# Patient Record
Sex: FEMALE | Race: WHITE | Employment: OTHER | URBAN - METROPOLITAN AREA
[De-identification: names, ages, dates, MRNs, and addresses within clinical notes are randomized per-mention and may not be internally consistent; named-entity substitution may affect disease eponyms.]

---

## 2021-12-15 ENCOUNTER — HOSPITAL ENCOUNTER (OUTPATIENT)
Dept: GENERAL RADIOLOGY | Age: 61
Discharge: HOME | End: 2021-12-15
Payer: COMMERCIAL

## 2021-12-15 ENCOUNTER — TRANSCRIBE ORDER (OUTPATIENT)
Dept: GENERAL RADIOLOGY | Age: 61
End: 2021-12-15

## 2021-12-15 DIAGNOSIS — M79.644 PAIN OF RIGHT THUMB: ICD-10-CM

## 2021-12-15 DIAGNOSIS — M79.645 PAIN OF LEFT THUMB: ICD-10-CM

## 2021-12-15 DIAGNOSIS — M79.645 PAIN OF LEFT THUMB: Primary | ICD-10-CM

## 2021-12-15 PROCEDURE — 73140 X-RAY EXAM OF FINGER(S): CPT

## 2021-12-26 ENCOUNTER — APPOINTMENT (OUTPATIENT)
Dept: GENERAL RADIOLOGY | Age: 61
DRG: 177 | End: 2021-12-26
Attending: EMERGENCY MEDICINE
Payer: COMMERCIAL

## 2021-12-26 ENCOUNTER — HOSPITAL ENCOUNTER (INPATIENT)
Age: 61
LOS: 5 days | Discharge: HOME | DRG: 177 | End: 2021-12-31
Attending: EMERGENCY MEDICINE | Admitting: INTERNAL MEDICINE
Payer: COMMERCIAL

## 2021-12-26 DIAGNOSIS — U07.1 COVID: ICD-10-CM

## 2021-12-26 DIAGNOSIS — J96.01 ACUTE RESPIRATORY FAILURE WITH HYPOXIA (HCC): Primary | ICD-10-CM

## 2021-12-26 LAB
ALBUMIN SERPL-MCNC: 2.8 G/DL (ref 3.5–5)
ALBUMIN/GLOB SERPL: 0.6 {RATIO} (ref 1.1–2.2)
ALP SERPL-CCNC: 58 U/L (ref 45–117)
ALT SERPL-CCNC: 34 U/L (ref 12–78)
ANION GAP SERPL CALC-SCNC: 11 MMOL/L (ref 5–15)
AST SERPL-CCNC: 48 U/L (ref 15–37)
BASOPHILS # BLD: 0 K/UL (ref 0–0.1)
BASOPHILS NFR BLD: 0 % (ref 0–1)
BILIRUB SERPL-MCNC: 0.6 MG/DL (ref 0.2–1)
BUN SERPL-MCNC: 20 MG/DL (ref 6–20)
BUN/CREAT SERPL: 20 (ref 12–20)
CALCIUM SERPL-MCNC: 8.6 MG/DL (ref 8.5–10.1)
CHLORIDE SERPL-SCNC: 101 MMOL/L (ref 97–108)
CO2 SERPL-SCNC: 25 MMOL/L (ref 21–32)
COVID-19 RAPID TEST, COVR: DETECTED
CREAT SERPL-MCNC: 0.98 MG/DL (ref 0.55–1.02)
CRP SERPL-MCNC: 7.14 MG/DL (ref 0–0.6)
D DIMER PPP FEU-MCNC: 0.61 MG/L FEU (ref 0–0.65)
DIFFERENTIAL METHOD BLD: ABNORMAL
EOSINOPHIL # BLD: 0 K/UL (ref 0–0.4)
EOSINOPHIL NFR BLD: 0 % (ref 0–7)
ERYTHROCYTE [DISTWIDTH] IN BLOOD BY AUTOMATED COUNT: 12.3 % (ref 11.5–14.5)
FERRITIN SERPL-MCNC: 788 NG/ML (ref 8–252)
FIBRINOGEN PPP-MCNC: 743 MG/DL (ref 200–475)
GLOBULIN SER CALC-MCNC: 4.4 G/DL (ref 2–4)
GLUCOSE SERPL-MCNC: 132 MG/DL (ref 65–100)
HCT VFR BLD AUTO: 46.1 % (ref 35–47)
HGB BLD-MCNC: 15.5 G/DL (ref 11.5–16)
IMM GRANULOCYTES # BLD AUTO: 0 K/UL
IMM GRANULOCYTES NFR BLD AUTO: 0 %
INR PPP: 1 (ref 0.9–1.1)
LACTATE BLD-SCNC: 1.57 MMOL/L (ref 0.4–2)
LDH SERPL L TO P-CCNC: 514 U/L (ref 81–246)
LYMPHOCYTES # BLD: 0.3 K/UL (ref 0.8–3.5)
LYMPHOCYTES NFR BLD: 4 % (ref 12–49)
MAGNESIUM SERPL-MCNC: 2.2 MG/DL (ref 1.6–2.4)
MCH RBC QN AUTO: 30.3 PG (ref 26–34)
MCHC RBC AUTO-ENTMCNC: 33.6 G/DL (ref 30–36.5)
MCV RBC AUTO: 90.2 FL (ref 80–99)
MONOCYTES # BLD: 0.1 K/UL (ref 0–1)
MONOCYTES NFR BLD: 1 % (ref 5–13)
NEUTS BAND NFR BLD MANUAL: 6 % (ref 0–6)
NEUTS SEG # BLD: 8 K/UL (ref 1.8–8)
NEUTS SEG NFR BLD: 86 % (ref 32–75)
NRBC # BLD: 0 K/UL (ref 0–0.01)
NRBC BLD-RTO: 0 PER 100 WBC
OTHER CELLS NFR BLD MANUAL: 3 %
PHOSPHATE SERPL-MCNC: 3.2 MG/DL (ref 2.6–4.7)
PLATELET # BLD AUTO: 288 K/UL (ref 150–400)
PMV BLD AUTO: 9.1 FL (ref 8.9–12.9)
POTASSIUM SERPL-SCNC: 3.7 MMOL/L (ref 3.5–5.1)
PROCALCITONIN SERPL-MCNC: 0.25 NG/ML
PROT SERPL-MCNC: 7.2 G/DL (ref 6.4–8.2)
PROTHROMBIN TIME: 9.9 SEC (ref 9–11.1)
RBC # BLD AUTO: 5.11 M/UL (ref 3.8–5.2)
RBC MORPH BLD: ABNORMAL
SODIUM SERPL-SCNC: 137 MMOL/L (ref 136–145)
SOURCE, COVRS: ABNORMAL
TROPONIN-HIGH SENSITIVITY: 8 NG/L (ref 0–51)
WBC # BLD AUTO: 8.7 K/UL (ref 3.6–11)

## 2021-12-26 PROCEDURE — 94762 N-INVAS EAR/PLS OXIMTRY CONT: CPT

## 2021-12-26 PROCEDURE — 96375 TX/PRO/DX INJ NEW DRUG ADDON: CPT

## 2021-12-26 PROCEDURE — 85384 FIBRINOGEN ACTIVITY: CPT

## 2021-12-26 PROCEDURE — 71045 X-RAY EXAM CHEST 1 VIEW: CPT

## 2021-12-26 PROCEDURE — 85610 PROTHROMBIN TIME: CPT

## 2021-12-26 PROCEDURE — 87077 CULTURE AEROBIC IDENTIFY: CPT

## 2021-12-26 PROCEDURE — 93005 ELECTROCARDIOGRAM TRACING: CPT

## 2021-12-26 PROCEDURE — 36415 COLL VENOUS BLD VENIPUNCTURE: CPT

## 2021-12-26 PROCEDURE — XW0DXM6 INTRODUCTION OF BARICITINIB INTO MOUTH AND PHARYNX, EXTERNAL APPROACH, NEW TECHNOLOGY GROUP 6: ICD-10-PCS | Performed by: INTERNAL MEDICINE

## 2021-12-26 PROCEDURE — 80053 COMPREHEN METABOLIC PANEL: CPT

## 2021-12-26 PROCEDURE — 74011250636 HC RX REV CODE- 250/636: Performed by: INTERNAL MEDICINE

## 2021-12-26 PROCEDURE — 87635 SARS-COV-2 COVID-19 AMP PRB: CPT

## 2021-12-26 PROCEDURE — 83735 ASSAY OF MAGNESIUM: CPT

## 2021-12-26 PROCEDURE — 86140 C-REACTIVE PROTEIN: CPT

## 2021-12-26 PROCEDURE — 84100 ASSAY OF PHOSPHORUS: CPT

## 2021-12-26 PROCEDURE — 74011250637 HC RX REV CODE- 250/637: Performed by: INTERNAL MEDICINE

## 2021-12-26 PROCEDURE — 84145 PROCALCITONIN (PCT): CPT

## 2021-12-26 PROCEDURE — 84484 ASSAY OF TROPONIN QUANT: CPT

## 2021-12-26 PROCEDURE — 99285 EMERGENCY DEPT VISIT HI MDM: CPT

## 2021-12-26 PROCEDURE — 85379 FIBRIN DEGRADATION QUANT: CPT

## 2021-12-26 PROCEDURE — 83615 LACTATE (LD) (LDH) ENZYME: CPT

## 2021-12-26 PROCEDURE — 82728 ASSAY OF FERRITIN: CPT

## 2021-12-26 PROCEDURE — 83605 ASSAY OF LACTIC ACID: CPT

## 2021-12-26 PROCEDURE — 77030038269 HC DRN EXT URIN PURWCK BARD -A

## 2021-12-26 PROCEDURE — 85025 COMPLETE CBC W/AUTO DIFF WBC: CPT

## 2021-12-26 PROCEDURE — 87040 BLOOD CULTURE FOR BACTERIA: CPT

## 2021-12-26 PROCEDURE — 65270000029 HC RM PRIVATE

## 2021-12-26 PROCEDURE — 96374 THER/PROPH/DIAG INJ IV PUSH: CPT

## 2021-12-26 PROCEDURE — 74011250636 HC RX REV CODE- 250/636: Performed by: EMERGENCY MEDICINE

## 2021-12-26 RX ORDER — ZONISAMIDE 100 MG/1
300 CAPSULE ORAL DAILY
COMMUNITY

## 2021-12-26 RX ORDER — ONDANSETRON 2 MG/ML
4 INJECTION INTRAMUSCULAR; INTRAVENOUS
Status: DISCONTINUED | OUTPATIENT
Start: 2021-12-26 | End: 2021-12-31 | Stop reason: HOSPADM

## 2021-12-26 RX ORDER — ACETAMINOPHEN 325 MG/1
650 TABLET ORAL
Status: DISCONTINUED | OUTPATIENT
Start: 2021-12-26 | End: 2021-12-31 | Stop reason: HOSPADM

## 2021-12-26 RX ORDER — SODIUM CHLORIDE 0.9 % (FLUSH) 0.9 %
5-40 SYRINGE (ML) INJECTION EVERY 8 HOURS
Status: DISCONTINUED | OUTPATIENT
Start: 2021-12-26 | End: 2021-12-31 | Stop reason: HOSPADM

## 2021-12-26 RX ORDER — VALACYCLOVIR HYDROCHLORIDE 500 MG/1
500 TABLET, FILM COATED ORAL DAILY
COMMUNITY

## 2021-12-26 RX ORDER — BENZONATATE 100 MG/1
100 CAPSULE ORAL
Status: DISCONTINUED | OUTPATIENT
Start: 2021-12-26 | End: 2021-12-31 | Stop reason: HOSPADM

## 2021-12-26 RX ORDER — AMITRIPTYLINE HYDROCHLORIDE 10 MG/1
30 TABLET, FILM COATED ORAL
Status: DISCONTINUED | OUTPATIENT
Start: 2021-12-26 | End: 2021-12-31 | Stop reason: HOSPADM

## 2021-12-26 RX ORDER — ZINC SULFATE 50(220)MG
1 CAPSULE ORAL DAILY
Status: DISCONTINUED | OUTPATIENT
Start: 2021-12-27 | End: 2021-12-31 | Stop reason: HOSPADM

## 2021-12-26 RX ORDER — PROMETHAZINE HYDROCHLORIDE 25 MG/1
12.5 TABLET ORAL
Status: DISCONTINUED | OUTPATIENT
Start: 2021-12-26 | End: 2021-12-31 | Stop reason: HOSPADM

## 2021-12-26 RX ORDER — DEXAMETHASONE SODIUM PHOSPHATE 4 MG/ML
10 INJECTION, SOLUTION INTRA-ARTICULAR; INTRALESIONAL; INTRAMUSCULAR; INTRAVENOUS; SOFT TISSUE ONCE
Status: COMPLETED | OUTPATIENT
Start: 2021-12-26 | End: 2021-12-26

## 2021-12-26 RX ORDER — AMITRIPTYLINE HYDROCHLORIDE 10 MG/1
30 TABLET, FILM COATED ORAL
COMMUNITY

## 2021-12-26 RX ORDER — KETOROLAC TROMETHAMINE 30 MG/ML
30 INJECTION, SOLUTION INTRAMUSCULAR; INTRAVENOUS
Status: COMPLETED | OUTPATIENT
Start: 2021-12-26 | End: 2021-12-26

## 2021-12-26 RX ORDER — LEVOTHYROXINE SODIUM 112 UG/1
112 TABLET ORAL
COMMUNITY

## 2021-12-26 RX ORDER — ASCORBIC ACID 500 MG
500 TABLET ORAL 2 TIMES DAILY
Status: DISCONTINUED | OUTPATIENT
Start: 2021-12-26 | End: 2021-12-31 | Stop reason: HOSPADM

## 2021-12-26 RX ORDER — TOLTERODINE TARTRATE 2 MG/1
2 TABLET, EXTENDED RELEASE ORAL 2 TIMES DAILY
COMMUNITY

## 2021-12-26 RX ORDER — DEXAMETHASONE SODIUM PHOSPHATE 4 MG/ML
6 INJECTION, SOLUTION INTRA-ARTICULAR; INTRALESIONAL; INTRAMUSCULAR; INTRAVENOUS; SOFT TISSUE EVERY 12 HOURS
Status: DISCONTINUED | OUTPATIENT
Start: 2021-12-26 | End: 2021-12-30

## 2021-12-26 RX ORDER — DEXAMETHASONE SODIUM PHOSPHATE 100 MG/10ML
20 INJECTION INTRAMUSCULAR; INTRAVENOUS EVERY 24 HOURS
Status: DISCONTINUED | OUTPATIENT
Start: 2021-12-26 | End: 2021-12-26

## 2021-12-26 RX ORDER — ACETAMINOPHEN 650 MG/1
650 SUPPOSITORY RECTAL
Status: DISCONTINUED | OUTPATIENT
Start: 2021-12-26 | End: 2021-12-31 | Stop reason: HOSPADM

## 2021-12-26 RX ORDER — ZONISAMIDE 100 MG/1
300 CAPSULE ORAL DAILY
Status: DISCONTINUED | OUTPATIENT
Start: 2021-12-26 | End: 2021-12-31 | Stop reason: HOSPADM

## 2021-12-26 RX ORDER — SODIUM CHLORIDE 0.9 % (FLUSH) 0.9 %
5-40 SYRINGE (ML) INJECTION AS NEEDED
Status: DISCONTINUED | OUTPATIENT
Start: 2021-12-26 | End: 2021-12-31 | Stop reason: HOSPADM

## 2021-12-26 RX ORDER — ENOXAPARIN SODIUM 100 MG/ML
30 INJECTION SUBCUTANEOUS EVERY 12 HOURS
Status: DISCONTINUED | OUTPATIENT
Start: 2021-12-26 | End: 2021-12-31 | Stop reason: HOSPADM

## 2021-12-26 RX ORDER — SODIUM CHLORIDE 9 MG/ML
50 INJECTION, SOLUTION INTRAVENOUS CONTINUOUS
Status: DISCONTINUED | OUTPATIENT
Start: 2021-12-26 | End: 2021-12-27

## 2021-12-26 RX ORDER — VALACYCLOVIR HYDROCHLORIDE 500 MG/1
500 TABLET, FILM COATED ORAL DAILY
Status: DISCONTINUED | OUTPATIENT
Start: 2021-12-27 | End: 2021-12-30

## 2021-12-26 RX ORDER — SODIUM CHLORIDE 0.9 % (FLUSH) 0.9 %
5-10 SYRINGE (ML) INJECTION AS NEEDED
Status: DISCONTINUED | OUTPATIENT
Start: 2021-12-26 | End: 2021-12-31 | Stop reason: HOSPADM

## 2021-12-26 RX ORDER — GUAIFENESIN/DEXTROMETHORPHAN 100-10MG/5
5 SYRUP ORAL
Status: DISCONTINUED | OUTPATIENT
Start: 2021-12-26 | End: 2021-12-31 | Stop reason: HOSPADM

## 2021-12-26 RX ORDER — LEVOTHYROXINE SODIUM 112 UG/1
112 TABLET ORAL
Status: DISCONTINUED | OUTPATIENT
Start: 2021-12-26 | End: 2021-12-31 | Stop reason: HOSPADM

## 2021-12-26 RX ORDER — POLYETHYLENE GLYCOL 3350 17 G/17G
17 POWDER, FOR SOLUTION ORAL DAILY PRN
Status: DISCONTINUED | OUTPATIENT
Start: 2021-12-26 | End: 2021-12-31 | Stop reason: HOSPADM

## 2021-12-26 RX ADMIN — AMITRIPTYLINE HYDROCHLORIDE 30 MG: 10 TABLET, FILM COATED ORAL at 21:38

## 2021-12-26 RX ADMIN — AZITHROMYCIN MONOHYDRATE 500 MG: 500 INJECTION, POWDER, LYOPHILIZED, FOR SOLUTION INTRAVENOUS at 16:25

## 2021-12-26 RX ADMIN — SODIUM CHLORIDE 500 ML: 9 INJECTION, SOLUTION INTRAVENOUS at 19:40

## 2021-12-26 RX ADMIN — Medication 10 ML: at 21:40

## 2021-12-26 RX ADMIN — Medication 10 ML: at 17:40

## 2021-12-26 RX ADMIN — ENOXAPARIN SODIUM 30 MG: 100 INJECTION SUBCUTANEOUS at 21:41

## 2021-12-26 RX ADMIN — SODIUM CHLORIDE 100 ML/HR: 9 INJECTION, SOLUTION INTRAVENOUS at 21:36

## 2021-12-26 RX ADMIN — DEXAMETHASONE SODIUM PHOSPHATE 6 MG: 4 INJECTION, SOLUTION INTRA-ARTICULAR; INTRALESIONAL; INTRAMUSCULAR; INTRAVENOUS; SOFT TISSUE at 21:40

## 2021-12-26 RX ADMIN — DEXAMETHASONE SODIUM PHOSPHATE 6 MG: 4 INJECTION, SOLUTION INTRA-ARTICULAR; INTRALESIONAL; INTRAMUSCULAR; INTRAVENOUS; SOFT TISSUE at 16:26

## 2021-12-26 RX ADMIN — OXYCODONE HYDROCHLORIDE AND ACETAMINOPHEN 500 MG: 500 TABLET ORAL at 17:33

## 2021-12-26 RX ADMIN — Medication 10 ML: at 09:07

## 2021-12-26 RX ADMIN — OXYCODONE HYDROCHLORIDE AND ACETAMINOPHEN 500 MG: 500 TABLET ORAL at 21:37

## 2021-12-26 RX ADMIN — KETOROLAC TROMETHAMINE 30 MG: 30 INJECTION, SOLUTION INTRAMUSCULAR; INTRAVENOUS at 08:56

## 2021-12-26 RX ADMIN — ZONISAMIDE 300 MG: 100 CAPSULE ORAL at 17:38

## 2021-12-26 RX ADMIN — ONDANSETRON 4 MG: 2 INJECTION INTRAMUSCULAR; INTRAVENOUS at 16:27

## 2021-12-26 RX ADMIN — DEXAMETHASONE SODIUM PHOSPHATE 10 MG: 4 INJECTION, SOLUTION INTRAMUSCULAR; INTRAVENOUS at 08:57

## 2021-12-26 RX ADMIN — BARICITINIB 4 MG: 2 TABLET, FILM COATED ORAL at 17:33

## 2021-12-26 RX ADMIN — LEVOTHYROXINE SODIUM 112 MCG: 0.11 TABLET ORAL at 17:39

## 2021-12-26 RX ADMIN — Medication 10 ML: at 08:57

## 2021-12-26 RX ADMIN — ENOXAPARIN SODIUM 30 MG: 100 INJECTION SUBCUTANEOUS at 10:34

## 2021-12-26 NOTE — ED NOTES
Timeout:EMTALA completed. Report to SOLE Fischer of [facility]. Discharge or Transfer Assessment: Patient A&O x4 and in no distress. Physical re-examination reveals some improvement in pts condition with reassessment of vital signs completed at the time of admission transfer and/or discharge.

## 2021-12-26 NOTE — H&P
[address]  [phone]    Admission History and Physical      NAME:  Jennifer Sutton   :   1960   MRN:  [id number]     PCP:  Marian Mckeon MD     Date/Time:  2021         Subjective:     CHIEF COMPLAINT: \"I feel bad\"     HISTORY OF PRESENT ILLNESS:     Ms. Gena Miles is a 62 y.o.  female with PMH of seizure d/o, hypothyroidism admitted for acute respiratory failure / COVID. Per pt, first started having symptoms on 12/15. Was started on vitamins and IVERMECTIN by her PCP. Pt is NOT vaccinated. Pt progressively worsened over the course of her illness to the point she presented to [facility] ED. Pt currently on 5L O2     Past Medical History:   Diagnosis Date    Autoimmune disease ([address]     hypothroidism    Neurological disorder     seizures        Past Surgical History:   Procedure Laterality Date    HX ORTHOPAEDIC      ruptureed disc       Social History     Tobacco Use    Smoking status: Former Smoker    Smokeless tobacco: Current User   Substance Use Topics    Alcohol use: Yes     Comment: rarely      PMH   HTN     No Known Allergies     Prior to Admission medications    Medication Sig Start Date End Date Taking? Authorizing Provider   valACYclovir (Valtrex) 500 mg tablet Take 500 mg by mouth daily. Yes Provider, Historical   tolterodine (DETROL) 2 mg tablet Take 2 mg by mouth two (2) times a day. Yes Provider, Historical   amitriptyline (ELAVIL) 10 mg tablet Take 30 mg by mouth nightly. Yes Provider, Historical   levothyroxine (LevoxyL) 112 mcg tablet Take 112 mcg by mouth Daily (before breakfast). Yes Provider, Historical   zonisamide (ZONEGRAN) 100 mg capsule Take 300 mg by mouth daily.    Yes Provider, Historical         Review of Systems:  (bold if positive, if negative)    Gen:  Eyes:  ENT:  CVS:  Pulm:  GI:    :    MS:  Skin:  Psych:  Endo:    Hem:  Renal:    Neuro:     Poor PO intake, dyspnea, cough, malaise, weakness       Objective:      VITALS:    Vital signs reviewed; most recent are:    Visit Vitals  /70   Pulse 79   Temp 100.1 °F (37.8 °C)   Resp 26   Ht 5' 9\" (1.753 m)   Wt 83.9 kg (185 lb)   SpO2 92%   BMI 27.32 kg/m²     SpO2 Readings from Last 6 Encounters:   12/26/21 92%   05/19/14 98%   04/24/13 98%   12/19/12 98%        No intake or output data in the 24 hours ending 12/26/21 0498         Exam:     Physical Exam:    Gen: Ill appearing. Increased WOB. Tachypneic  HEENT:  Pink conjunctivae, PERRL, hearing intact to voice, moist mucous membranes  Neck:  Supple, without masses, thyroid non-tender  Resp: Diffuse crackles   Card:  No murmurs, normal S1, S2 without thrills, bruits or peripheral edema  Abd:  Soft, non-tender, non-distended, normoactive bowel sounds are present, no palpable organomegaly  Lymph:  No cervical adenopathy  Musc:  No cyanosis or clubbing  Skin:  No rashes or ulcers, skin turgor is good  Neuro:  Cranial nerves 3-12 are grossly intact,  strength is 5/5 bilaterally, dorsi / plantarflexion strength is 5/5 bilaterally, follows commands appropriately  Psych:  Alert with good insight. Oriented to person, place, and time       Labs:    Recent Labs     12/26/21  0839   WBC 8.7   HGB 15.5   HCT 46.1        Recent Labs     12/26/21  0839      K 3.7      CO2 25   *   BUN 20   CREA 0.98   CA 8.6   MG 2.2   PHOS 3.2   ALB 2.8*   ALT 34     No components found for: GLPOC  No results for input(s): PH, PCO2, PO2, HCO3, FIO2 in the last 72 hours. Recent Labs     12/26/21  0839   INR 1.0     CXR => diffuse bilateral interstitial infiltrates        Assessment/Plan:     Acute respiratory failure with hypoxia - unvaccinated. High risk for decompensation because of age, lack of vaccination status, CXR findings/degree of inflammatory marker elevation.  PCP started her on ivermectin which has NOT been clinically proven to provide any benefit in covid and it is unclear that it may not even lead to harm as not routinely used in humans. STOP Ivermectin   -admit   -start IV decadron   -start barcitinib   -vitamin C/zinc   -procalcitonin low; likely will stop azithro if remains low   -d-dimer WNL;  Lovenox for DVT prophylaxis   -incentive spirometry, OOB, prone; pt voice understanding   -trend inflammatory markers   -wean O2 as able       Seizure disorder ([facility] [address] (4/18/2013)  -continue outpt Zonegran       Hypothyroidism (4/18/2013)  -on levothyroxine    Surrogate decision maker:      Total time spent with patient: [address] discussed with: Patient and Nursing Staff    Discussed:  Care Plan    Prophylaxis:  Lovenox    Probable Disposition:  Home w/Family           ___________________________________________________    Attending Physician: Tevin Oropeza MD

## 2021-12-26 NOTE — ED NOTES
TRANSFER - OUT REPORT:    Verbal report given to [facility], [facility] RN(name) on Geneva Saucedo  being transferred to [facility] ED Hold(unit) for routine progression of care       Report consisted of patients Situation, Background, Assessment and   Recommendations(SBAR). Information from the following report(s) ED Summary, MAR and Recent Results was reviewed with the receiving nurse. Lines:   Peripheral IV 12/26/21 Left Antecubital (Active)   Site Assessment Clean, dry, & intact 12/26/21 0842   Phlebitis Assessment 0 12/26/21 0842   Infiltration Assessment 0 12/26/21 0842   Dressing Status Clean, dry, & intact 12/26/21 0842   Dressing Type Tape;Transparent 12/26/21 0842   Hub Color/Line Status Pink;Flushed;Patent 12/26/21 0842   Action Taken Blood drawn 12/26/21 9013        Opportunity for questions and clarification was provided.       Patient transported with:   [email or web address]

## 2021-12-26 NOTE — PROGRESS NOTES
[facility]: MED RECONCILIATION      Information obtained from: Patient in covid isolation. Called room and personal phone. Not answered. Talked to Patient's  who read me her medication bottles. Also confirmed this with Consuelo Pharmacist.      Allergies: Patient has no known allergies. Comment:  Zonisamide 300 mg daily: Based on last fill at [facility], patient is about 50 days overdue on the refill. I was not able to assess compliance with the patient herself. Prior to Admission Medications:     Medication Documentation Review Audit       Reviewed by YADIEL ReynosoD (Pharmacist) on 12/26/21 at [phone]      Medication Sig Documenting Provider Last Dose Status Taking?   amitriptyline (ELAVIL) 10 mg tablet Take 30 mg by mouth nightly. Provider, Historical  Active Yes   levothyroxine (LevoxyL) 112 mcg tablet Take 112 mcg by mouth Daily (before breakfast). Provider, Historical  Active Yes   tolterodine (DETROL) 2 mg tablet Take 2 mg by mouth two (2) times a day. Provider, Historical  Active Yes   valACYclovir (Valtrex) 500 mg tablet Take 500 mg by mouth daily. Provider, Historical  Active Yes   zonisamide (ZONEGRAN) 100 mg capsule Take 300 mg by mouth daily.  Provider, Historical  Active Yes                      [address] [facility]   Contact: [phone]

## 2021-12-26 NOTE — ED PROVIDER NOTES
62-year-old female presents with a chief complaint of shortness of breath. Patient was diagnosed with Covid on Monday. She was prescribed ivermectin by Dr. Slime Cervantes. She reports feeling well for several days; however, she has gotten progressively worse through the weekend and now has difficulty breathing. EMS was called to her home and found her to be hypoxic. She was placed on supplemental oxygen with improvement in her saturations. She has had fever and reports that she feels so weak that she cannot stand. He denies chest pain, abdominal pain, GI or urinary symptoms. Patient was not vaccinated and does vape. Past Medical History:   Diagnosis Date    Autoimmune disease ([facility] [address]     hypothroidism    Neurological disorder     seizures       Past Surgical History:   Procedure Laterality Date    HX ORTHOPAEDIC  1996    ruptureed disc         No family history on file. Social History     Socioeconomic History    Marital status: UNKNOWN     Spouse name: Not on file    Number of children: Not on file    Years of education: Not on file    Highest education level: Not on file   Occupational History    Not on file   Tobacco Use    Smoking status: Never Smoker    Smokeless tobacco: Not on file   Substance and Sexual Activity    Alcohol use: No    Drug use: No    Sexual activity: Not on file   Other Topics Concern    Not on file   Social History Narrative    Not on file     Social Determinants of Health     Financial Resource Strain:     Difficulty of Paying Living Expenses: Not on file   Food Insecurity:     Worried About Running Out of Food in the Last Year: Not on file    Migel of Food in the Last Year: Not on file   Transportation Needs:     Lack of Transportation (Medical): Not on file    Lack of Transportation (Non-Medical):  Not on file   Physical Activity:     Days of Exercise per Week: Not on file    Minutes of Exercise per Session: Not on file   Stress:     Feeling of Stress : Not on file   Social Connections:     Frequency of Communication with Friends and Family: Not on file    Frequency of Social Gatherings with Friends and Family: Not on file    Attends Zoroastrian Services: Not on file    Active Member of Clubs or Organizations: Not on file    Attends Club or Organization Meetings: Not on file    Marital Status: Not on file   Intimate Partner Violence:     Fear of Current or Ex-Partner: Not on file    Emotionally Abused: Not on file    Physically Abused: Not on file    Sexually Abused: Not on file   Housing Stability:     Unable to Pay for Housing in the Last Year: Not on file    Number of [place] in the Last Year: Not on file    Unstable Housing in the Last Year: Not on file         ALLERGIES: Patient has no known allergies. Review of Systems   Constitutional: Positive for fatigue and fever. HENT: Negative for rhinorrhea. Respiratory: Positive for shortness of breath. Cardiovascular: Negative for chest pain. Gastrointestinal: Negative for abdominal pain. Genitourinary: Negative for dysuria. Musculoskeletal: Negative for back pain. Skin: Negative for wound. Neurological: Negative for headaches. Psychiatric/Behavioral: Negative for confusion. There were no vitals filed for this visit. Physical Exam  Vitals and nursing note reviewed. Constitutional:       General: She is not in acute distress. Appearance: Normal appearance. She is not ill-appearing, toxic-appearing or diaphoretic. HENT:      Head: Normocephalic and atraumatic. Eyes:      Extraocular Movements: Extraocular movements intact. Cardiovascular:      Rate and Rhythm: Normal rate. Pulses: Normal pulses. Pulmonary:      Effort: Pulmonary effort is normal. No respiratory distress. Breath sounds: Rales present. Abdominal:      General: Abdomen is flat. Bowel sounds are normal. There is no distension. Palpations: Abdomen is soft.       Tenderness: There is no abdominal tenderness. Musculoskeletal:         General: Normal range of motion. Cervical back: Normal range of motion. Skin:     General: Skin is warm and dry. Neurological:      General: No focal deficit present. Mental Status: She is alert and oriented to person, place, and time. Psychiatric:         Mood and Affect: Mood normal.          MDM  Number of Diagnoses or Management Options  Acute respiratory failure with hypoxia ([address]  COVID  Diagnosis management comments: 52-year-old female presents with shortness of breath. She has known Covid positive. She was hypoxic on room air and was placed on supplemental oxygen with improvement in her work of breathing and oxygen saturation. Labs unremarkable. Patient will be admitted for management of her hypoxic respiratory failure. She is comfortable and agreeable to plan of care and will be transferred to [facility] by ambulance. EKG shows sinus rhythm at a rate of 87, normal intervals, normal axis, no ischemic changes. Perfect Serve Consult for Admission  8:43 AM    ED Room Number: DEO57/48  Patient Name and age:   Cindi Sanon 62 y.o.  female  Working Diagnosis: Acute respiratory failure with hypoxia ([address]  (primary encounter diagnosis)  COVID    COVID-19 Suspicion:  yes  Sepsis present:  no  Reassessment needed: no  Code Status:  Full Code  Readmission: no  Isolation Requirements:  yes  Recommended Level of Care:  telemetry  Department:[facility] - [phone]  Other:  Now on NC    Total critical care time spent exclusive of procedures:  37 minutes           Amount and/or Complexity of Data Reviewed  Clinical lab tests: ordered and reviewed  Tests in the radiology section of CPT®: ordered and reviewed           Procedures

## 2021-12-26 NOTE — ED TRIAGE NOTES
Pt presents to ED per RS with c/o worsening fatigue over the past week. The pt was diagnosed with COVID 12/20/2021. The pt is on 100% non-rebreather. Her breathing is slightly labored. The pt is awake and alert and responding appropriately.

## 2021-12-27 LAB
ANION GAP SERPL CALC-SCNC: 8 MMOL/L (ref 5–15)
ATRIAL RATE: 87 BPM
BASOPHILS # BLD: 0 K/UL (ref 0–0.1)
BASOPHILS NFR BLD: 0 % (ref 0–1)
BNP SERPL-MCNC: 44 PG/ML
BUN SERPL-MCNC: 23 MG/DL (ref 6–20)
BUN/CREAT SERPL: 32 (ref 12–20)
CALCIUM SERPL-MCNC: 8.4 MG/DL (ref 8.5–10.1)
CALCULATED P AXIS, ECG09: 56 DEGREES
CALCULATED R AXIS, ECG10: 17 DEGREES
CALCULATED T AXIS, ECG11: -3 DEGREES
CHLORIDE SERPL-SCNC: 108 MMOL/L (ref 97–108)
CO2 SERPL-SCNC: 23 MMOL/L (ref 21–32)
CREAT SERPL-MCNC: 0.72 MG/DL (ref 0.55–1.02)
CRP SERPL-MCNC: 5.08 MG/DL (ref 0–0.6)
D DIMER PPP FEU-MCNC: 0.46 MG/L FEU (ref 0–0.65)
DIAGNOSIS, 93000: NORMAL
DIFFERENTIAL METHOD BLD: ABNORMAL
EOSINOPHIL # BLD: 0 K/UL (ref 0–0.4)
EOSINOPHIL NFR BLD: 0 % (ref 0–7)
ERYTHROCYTE [DISTWIDTH] IN BLOOD BY AUTOMATED COUNT: 12 % (ref 11.5–14.5)
EST. AVERAGE GLUCOSE BLD GHB EST-MCNC: 131 MG/DL
GLUCOSE SERPL-MCNC: 180 MG/DL (ref 65–100)
HBA1C MFR BLD: 6.2 % (ref 4–5.6)
HCT VFR BLD AUTO: 42.4 % (ref 35–47)
HGB BLD-MCNC: 14.7 G/DL (ref 11.5–16)
IMM GRANULOCYTES # BLD AUTO: 0 K/UL
IMM GRANULOCYTES NFR BLD AUTO: 0 %
LYMPHOCYTES # BLD: 0.7 K/UL (ref 0.8–3.5)
LYMPHOCYTES NFR BLD: 24 % (ref 12–49)
MAGNESIUM SERPL-MCNC: 2.4 MG/DL (ref 1.6–2.4)
MCH RBC QN AUTO: 31 PG (ref 26–34)
MCHC RBC AUTO-ENTMCNC: 34.7 G/DL (ref 30–36.5)
MCV RBC AUTO: 89.5 FL (ref 80–99)
MONOCYTES # BLD: 0.4 K/UL (ref 0–1)
MONOCYTES NFR BLD: 16 % (ref 5–13)
NEUTS BAND NFR BLD MANUAL: 5 % (ref 0–6)
NEUTS SEG # BLD: 1.7 K/UL (ref 1.8–8)
NEUTS SEG NFR BLD: 55 % (ref 32–75)
NRBC # BLD: 0 K/UL (ref 0–0.01)
NRBC BLD-RTO: 0 PER 100 WBC
P-R INTERVAL, ECG05: 124 MS
PLATELET # BLD AUTO: 302 K/UL (ref 150–400)
PMV BLD AUTO: 9.1 FL (ref 8.9–12.9)
POTASSIUM SERPL-SCNC: 4 MMOL/L (ref 3.5–5.1)
PROCALCITONIN SERPL-MCNC: 0.13 NG/ML
Q-T INTERVAL, ECG07: 372 MS
QRS DURATION, ECG06: 72 MS
QTC CALCULATION (BEZET), ECG08: 447 MS
RBC # BLD AUTO: 4.74 M/UL (ref 3.8–5.2)
RBC MORPH BLD: ABNORMAL
SODIUM SERPL-SCNC: 139 MMOL/L (ref 136–145)
VENTRICULAR RATE, ECG03: 87 BPM
WBC # BLD AUTO: 2.8 K/UL (ref 3.6–11)

## 2021-12-27 PROCEDURE — 85025 COMPLETE CBC W/AUTO DIFF WBC: CPT

## 2021-12-27 PROCEDURE — 85379 FIBRIN DEGRADATION QUANT: CPT

## 2021-12-27 PROCEDURE — 93005 ELECTROCARDIOGRAM TRACING: CPT

## 2021-12-27 PROCEDURE — 97116 GAIT TRAINING THERAPY: CPT

## 2021-12-27 PROCEDURE — 94640 AIRWAY INHALATION TREATMENT: CPT

## 2021-12-27 PROCEDURE — 74011250636 HC RX REV CODE- 250/636: Performed by: INTERNAL MEDICINE

## 2021-12-27 PROCEDURE — 97161 PT EVAL LOW COMPLEX 20 MIN: CPT

## 2021-12-27 PROCEDURE — 74011250637 HC RX REV CODE- 250/637: Performed by: INTERNAL MEDICINE

## 2021-12-27 PROCEDURE — 84145 PROCALCITONIN (PCT): CPT

## 2021-12-27 PROCEDURE — 83880 ASSAY OF NATRIURETIC PEPTIDE: CPT

## 2021-12-27 PROCEDURE — 83036 HEMOGLOBIN GLYCOSYLATED A1C: CPT

## 2021-12-27 PROCEDURE — 36415 COLL VENOUS BLD VENIPUNCTURE: CPT

## 2021-12-27 PROCEDURE — 86140 C-REACTIVE PROTEIN: CPT

## 2021-12-27 PROCEDURE — 97530 THERAPEUTIC ACTIVITIES: CPT

## 2021-12-27 PROCEDURE — 65270000029 HC RM PRIVATE

## 2021-12-27 PROCEDURE — 80048 BASIC METABOLIC PNL TOTAL CA: CPT

## 2021-12-27 PROCEDURE — 83735 ASSAY OF MAGNESIUM: CPT

## 2021-12-27 RX ADMIN — OXYCODONE HYDROCHLORIDE AND ACETAMINOPHEN 500 MG: 500 TABLET ORAL at 22:41

## 2021-12-27 RX ADMIN — IPRATROPIUM BROMIDE AND ALBUTEROL 1 PUFF: 20; 100 SPRAY, METERED RESPIRATORY (INHALATION) at 13:40

## 2021-12-27 RX ADMIN — Medication 10 ML: at 13:44

## 2021-12-27 RX ADMIN — ZONISAMIDE 300 MG: 100 CAPSULE ORAL at 18:50

## 2021-12-27 RX ADMIN — Medication 1 CAPSULE: at 09:14

## 2021-12-27 RX ADMIN — SODIUM CHLORIDE 100 ML/HR: 9 INJECTION, SOLUTION INTRAVENOUS at 03:44

## 2021-12-27 RX ADMIN — Medication 10 ML: at 05:42

## 2021-12-27 RX ADMIN — Medication 10 ML: at 22:43

## 2021-12-27 RX ADMIN — DEXAMETHASONE SODIUM PHOSPHATE 6 MG: 4 INJECTION, SOLUTION INTRA-ARTICULAR; INTRALESIONAL; INTRAMUSCULAR; INTRAVENOUS; SOFT TISSUE at 09:14

## 2021-12-27 RX ADMIN — BARICITINIB 4 MG: 2 TABLET, FILM COATED ORAL at 09:14

## 2021-12-27 RX ADMIN — IPRATROPIUM BROMIDE AND ALBUTEROL 1 PUFF: 20; 100 SPRAY, METERED RESPIRATORY (INHALATION) at 21:09

## 2021-12-27 RX ADMIN — AZITHROMYCIN MONOHYDRATE 500 MG: 500 INJECTION, POWDER, LYOPHILIZED, FOR SOLUTION INTRAVENOUS at 13:26

## 2021-12-27 RX ADMIN — OXYCODONE HYDROCHLORIDE AND ACETAMINOPHEN 500 MG: 500 TABLET ORAL at 09:14

## 2021-12-27 RX ADMIN — ENOXAPARIN SODIUM 30 MG: 100 INJECTION SUBCUTANEOUS at 09:14

## 2021-12-27 RX ADMIN — ENOXAPARIN SODIUM 30 MG: 100 INJECTION SUBCUTANEOUS at 22:48

## 2021-12-27 RX ADMIN — DEXAMETHASONE SODIUM PHOSPHATE 6 MG: 4 INJECTION, SOLUTION INTRA-ARTICULAR; INTRALESIONAL; INTRAMUSCULAR; INTRAVENOUS; SOFT TISSUE at 22:43

## 2021-12-27 RX ADMIN — LEVOTHYROXINE SODIUM 112 MCG: 0.11 TABLET ORAL at 05:42

## 2021-12-27 RX ADMIN — AMITRIPTYLINE HYDROCHLORIDE 30 MG: 10 TABLET, FILM COATED ORAL at 22:41

## 2021-12-27 NOTE — PROGRESS NOTES
Problem: Mobility Impaired (Adult and Pediatric)  Goal: *Acute Goals and Plan of Care (Insert Text)  Description: FUNCTIONAL STATUS PRIOR TO ADMISSION: Patient was independent and active without use of DME.    HOME SUPPORT PRIOR TO ADMISSION: The patient lived with family but did not require assist.    Physical Therapy Goals  Initiated 12/27/2021  1. Patient will move from supine to sit and sit to supine , scoot up and down, and roll side to side in bed with independence within 7 day(s). 2.  Patient will transfer from bed to chair and chair to bed with independence using the least restrictive device within 7 day(s). 3.  Patient will perform sit to stand with independence within 7 day(s). 4.  Patient will ambulate with independence for 200 feet with the least restrictive device within 7 day(s). 5.  Patient will ascend/descend 4 stairs with  handrail(s) with modified independence within 7 day(s). Outcome: Progressing Towards Goal   PHYSICAL THERAPY EVALUATION  Patient: Devi Cruz (62 y.o. female)  Date: 12/27/2021  Primary Diagnosis: Acute hypoxemic respiratory failure due to COVID-19 ([facility]) [U07.1, J96.01]        Precautions: covid +       ASSESSMENT  Based on the objective data described below, the patient presents with generalized weakness and debility. Communicated with nurse cleared for the therapy. Patient supine on bed in ED when received on 6 liter oxygen. Saturations ranges from 90% to 91% supine on bed at rest. Patient oxygen saturation dropped to 80% while supine on bed and putting on hospital gown. Educate patient to do purse lip breathing when feeling short of breath. Saturation goes up to 93%  after purse lip breathing. Took about a minutes to recover to above 90% however patient declined to sit up feel so weak and exhausted. Performed some active range of motion exercise on both LE all planes.  Patient has been prone lying at times and continue to encouraged patient to do prone laying at least 20 to 30 minutes at a time and Educate and instructed patient to continue active range of motion exercise on both legs while up on chair or on bed multiple times. Recommend patient to be up on the chair at least 3 times a day every meal times as tolerated. Communicated with nurse who agreed to monitor patient. Current Level of Function Impacting Discharge (mobility/balance): SBA with bed mobility    Functional Outcome Measure: The patient scored 60/100 on the barthel index outcome measure. Other factors to consider for discharge: fall     Patient will benefit from skilled therapy intervention to address the above noted impairments. PLAN :  Recommendations and Planned Interventions: bed mobility training, transfer training, gait training, therapeutic exercises, neuromuscular re-education, orthotic/prosthetic training, patient and family training/education, and therapeutic activities      Frequency/Duration: Patient will be followed by physical therapy:  5 times a week to address goals. Recommendation for discharge: (in order for the patient to meet his/her long term goals)  Physical therapy at least 2 days/week in the home     This discharge recommendation:  Has been made in collaboration with the attending provider and/or case management    IF patient discharges home will need the following DME: patient owns DME required for discharge         SUBJECTIVE:   Patient stated [address]     OBJECTIVE DATA SUMMARY:   HISTORY:    Past Medical History:   Diagnosis Date    Autoimmune disease ([address]     hypothroidism    Neurological disorder     seizures     Past Surgical History:   Procedure Laterality Date    HX ORTHOPAEDIC  1996    ruptureed disc       Personal factors and/or comorbidities impacting plan of care:          EXAMINATION/PRESENTATION/DECISION MAKING:   Critical Behavior:              Hearing:   Auditory  Auditory Impairment: None    Range Of Motion:  AROM: Within functional limits PROM: Within functional limits           Strength:    Strength: Generally decreased, functional                    Tone & Sensation:                                  Coordination:  Coordination: Within functional limits  Vision:      Functional Mobility:  Bed Mobility:  Rolling: Stand-by assistance  Supine to Sit: Stand-by assistance  Sit to Supine: Stand-by assistance  Scooting: Stand-by assistance  Transfers:                             Balance:   Sitting: Intact; High guard  Ambulation/Gait Training:                                              Therapeutic Exercises:   Educate and instructed patient to continue active range of motion exercise on both legs while up on chair or on bed multiple times. Recommend patient to be up on the chair at least 3 times a day every meal times as tolerated. Functional Measure:  Barthel Index:    Bathin  Bladder: 5  Bowels: 10  Groomin  Dressing: 10  Feeding: 10  Mobility: 0  Stairs: 0  Toilet Use: 5  Transfer (Bed to Chair and Back): 10  Total: 60/100       The Barthel ADL Index: Guidelines  1. The index should be used as a record of what a patient does, not as a record of what a patient could do. 2. The main aim is to establish degree of independence from any help, physical or verbal, however minor and for whatever reason. 3. The need for supervision renders the patient not independent. 4. A patient's performance should be established using the best available evidence. Asking the patient, friends/relatives and nurses are the usual sources, but direct observation and common sense are also important. However direct testing is not needed. 5. Usually the patient's performance over the preceding 24-48 hours is important, but occasionally longer periods will be relevant. 6. Middle categories imply that the patient supplies over 50 per cent of the effort. 7. Use of aids to be independent is allowed.     Score Interpretation (from [address] [place] [address])    Independent 60-79 Minimally independent   40-59 Partially dependent   20-39 Very dependent   <20 Totally dependent     -Neil Sauer, Barthel, D.W. (3921). Functional evaluation: the Barthel Index. [address] ([address] [facility] [address] [facility] [address] (). The Barthel activities of daily living index: self-reporting versus actual performance in the old (> or = 75 years). Journal of [address] [place] [address] 45(7), [address] [facility], GURDEEP, Tre Rich., [facility]. (). Measuring the change in disability after inpatient rehabilitation; comparison of the responsiveness of the Barthel Index and Functional Michie Measure. Journal of Neurology, Neurosurgery, and Psychiatry, 66(4), 873-610. SANDIP Vallejo, AILEEN Monique, & Lary Ferrari M.A. (2004) Assessment of post-stroke quality of life in cost-effectiveness studies: The usefulness of the Barthel Index and the EuroQoL-5D. Quality of Life Research, 15, 240-11           Physical Therapy Evaluation Charge Determination   History Examination Presentation Decision-Making   LOW Complexity : Zero comorbidities / personal factors that will impact the outcome / POC LOW Complexity : 1-2 Standardized tests and measures addressing body structure, function, activity limitation and / or participation in recreation  LOW Complexity : Stable, uncomplicated  Other outcome measures barthel  LOW       Based on the above components, the patient evaluation is determined to be of the following complexity level: LOW     Pain Ratin/10    Activity Tolerance:   Good    After treatment patient left in no apparent distress:   Supine in bed, Heels elevated for pressure relief, Call bell within reach, Bed / chair alarm activated, and Side rails x 3    COMMUNICATION/EDUCATION:   The patients plan of care was discussed with: Registered nurse. Fall prevention education was provided and the patient/caregiver indicated understanding.     Thank you for this referral.  Fay Park, [facility].    Time Calculation: 28 mins

## 2021-12-27 NOTE — PROGRESS NOTES
Ton Mckeon [facility] [place] [address]  [address] [facility], [address] [place] [address]  [phone]      Medical Progress Note      NAME: Dejah Hart   :  1960  MRM:  [id number]    Date/Time: 2021  12:42 PM         Subjective:     Chief Complaint: \"I feel a little less weak\"     Pt seen and examined. Feels slightly better. Remains on 6L     ROS:  (bold if positive, if negative)      SOB        Objective:       Vitals:          Last 24hrs VS reviewed since prior progress note.  Most recent are:    Visit Vitals  /65   Pulse 75   Temp 98.4 °F (36.9 °C)   Resp 17   Ht 5' 9\" (1.753 m)   Wt 83.9 kg (185 lb)   SpO2 93%   BMI 27.32 kg/m²     SpO2 Readings from Last 6 Encounters:   21 93%   14 98%   13 98%   12 98%    O2 Flow Rate (L/min): 6 l/min       Intake/Output Summary (Last 24 hours) at 2021 1242  Last data filed at 2021 1102  Gross per 24 hour   Intake 1790 ml   Output 800 ml   Net 990 ml          Exam:     Physical Exam:    Gen:  Well-developed, well-nourished, in no acute distress  HEENT:  Pink conjunctivae, PERRL, hearing intact to voice, moist mucous membranes  Neck:  Supple, without masses, thyroid non-tender  Resp: diffuse crackles   Card:  No murmurs, normal S1, S2 without thrills, bruits or peripheral edema  Abd:  Soft, non-tender, non-distended, normoactive bowel sounds are present  Musc:  No cyanosis or clubbing  Skin:  No rashes or ulcers, skin turgor is good  Neuro:  Cranial nerves 3-12 are grossly intact,  strength is 5/5 bilaterally and dorsi / plantarflexion is 5/5 bilaterally, follows commands appropriately  Psych:  Good insight, oriented to person, place and time, alert    Medications Reviewed: (see below)    Lab Data Reviewed: (see below)    ______________________________________________________________________    Medications:     Current Facility-Administered Medications   Medication Dose Route Frequency    ipratropium-albuterol (COMBIVENT RESPIMAT) 20 mcg-100 mcg inhalation spray  1 Puff Inhalation Q6H RT    sodium chloride (NS) flush 5-10 mL  5-10 mL IntraVENous PRN    sodium chloride (NS) flush 5-40 mL  5-40 mL IntraVENous Q8H    sodium chloride (NS) flush 5-40 mL  5-40 mL IntraVENous PRN    acetaminophen (TYLENOL) tablet 650 mg  650 mg Oral Q6H PRN    Or    acetaminophen (TYLENOL) suppository 650 mg  650 mg Rectal Q6H PRN    polyethylene glycol (MIRALAX) packet 17 g  17 g Oral DAILY PRN    promethazine (PHENERGAN) tablet 12.5 mg  12.5 mg Oral Q6H PRN    Or    ondansetron (ZOFRAN) injection 4 mg  4 mg IntraVENous Q6H PRN    enoxaparin (LOVENOX) injection 30 mg  30 mg SubCUTAneous Q12H    guaiFENesin-dextromethorphan (ROBITUSSIN DM) 100-10 mg/5 mL syrup 5 mL  5 mL Oral Q4H PRN    ascorbic acid (vitamin C) (VITAMIN C) tablet 500 mg  500 mg Oral BID    zinc sulfate (ZINCATE) 50 mg zinc (220 mg) capsule 1 Capsule  1 Capsule Oral DAILY    azithromycin (ZITHROMAX) 500 mg in 0.9% sodium chloride 250 mL (VIAL-MATE)  500 mg IntraVENous Q24H    benzonatate (TESSALON) capsule 100 mg  100 mg Oral TID PRN    dexamethasone (DECADRON) 4 mg/mL injection 6 mg  6 mg IntraVENous Q12H    baricitinib (OLUMIANT) tablet 4 mg  4 mg Oral DAILY    zonisamide (ZONEGRAN) capsule 300 mg  300 mg Oral DAILY    levothyroxine (SYNTHROID) tablet 112 mcg  112 mcg Oral 6am    amitriptyline (ELAVIL) tablet 30 mg  30 mg Oral QHS    valACYclovir (VALTREX) tablet 500 mg  500 mg Oral DAILY     Current Outpatient Medications   Medication Sig    valACYclovir (Valtrex) 500 mg tablet Take 500 mg by mouth daily.  tolterodine (DETROL) 2 mg tablet Take 2 mg by mouth two (2) times a day.  amitriptyline (ELAVIL) 10 mg tablet Take 30 mg by mouth nightly.  levothyroxine (LevoxyL) 112 mcg tablet Take 112 mcg by mouth Daily (before breakfast).  zonisamide (ZONEGRAN) 100 mg capsule Take 300 mg by mouth daily.             Lab Review:     Recent Labs 12/27/21  0343 12/26/21  0839   WBC 2.8* 8.7   HGB 14.7 15.5   HCT 42.4 46.1    288     Recent Labs     12/27/21  0343 12/26/21  0839    137   K 4.0 3.7    101   CO2 23 25   * 132*   BUN 23* 20   CREA 0.72 0.98   CA 8.4* 8.6   MG 2.4 2.2   PHOS  --  3.2   ALB  --  2.8*   ALT  --  34   INR  --  1.0     No components found for: GLPOC         Assessment / Plan:   Acute respiratory failure with hypoxia - unvaccinated. High risk for decompensation because of age, lack of vaccination status, CXR findings/degree of inflammatory marker elevation. PCP started her on ivermectin which has NOT been clinically proven to provide any benefit in covid and it is unclear that it may not even lead to harm as not routinely used in humans. STOP Ivermectin   -continue IV decadron and baricitinib   -vitamin C/zinc   -procalcitonin low; likely will stop azithro if remains low (suspect d/c in the AM)   -d-dimer WNL;  Lovenox for DVT prophylaxis   -incentive spirometry, OOB, prone; pt voice understanding   -trend inflammatory markers (CRP downtrending, d-dimer has been low)   -wean O2         Seizure disorder ([facility] [address] (4/18/2013)  -continue outpt Zonegran        Hypothyroidism (4/18/2013)  -on levothyroxine    Total time spent with patient: 30 [address] discussed with: Patient and Nursing Staff    Discussed:  Care Plan    Prophylaxis:  Lovenox    Disposition:  Home w/Family           ___________________________________________________    Attending Physician: Tevin Oropeza MD

## 2021-12-27 NOTE — PROGRESS NOTES
12/27/2021  1:49 PM  Case management note    Reason for Admission:  Acute respiratory failure. Patient came to hospital for Gissell patient is , independent with ADL's and drives. Patient has history of seizures and thyroid. Eval done with spouse via phone who is also COVID +. Patient is not vaccinatted  [employer]uel[facility] @ [employer]                     RUR Score:     6%                Plan for utilizing home health:      PT/OT to eval    PCP: First and Last name:  Marilyn Dominguez MD     Name of Practice:    Are you a current patient: Yes/No: yes   Approximate date of last visit: 1 week   Can you participate in a virtual visit with your PCP:                     Current Advanced Directive/Advance Care Plan: Full Code NO AD      Healthcare Decision Maker:   Yen Larios spouse                              Transition of Care Plan:              1. Home with family assistance  2. PCP follow up  3. AD planning  4. CM to follow for discharge needs  Care Management Interventions  PCP Verified by CM: Yes (Dr. Heather rachel)  Support Systems: Spouse/Significant Other  Confirm Follow Up Transport: Family  The Plan for Transition of Care is Related to the Following Treatment Goals : Gissell  Discharge Location  Discharge Placement: Home with family assistance              Geeta PITTMAN                Unable to reach patient or family by phone.   Will continue to follow  Oanh Harrington

## 2021-12-27 NOTE — ED NOTES
Verbal shift change report given to Kirill Baxter RN (oncoming nurse) by Polo Woodruff RN (offgoing nurse). Report included the following information SBAR, ED Summary, MAR and Recent Results.

## 2021-12-28 LAB
ANION GAP SERPL CALC-SCNC: 10 MMOL/L (ref 5–15)
ATRIAL RATE: 73 BPM
BASOPHILS # BLD: 0 K/UL (ref 0–0.1)
BASOPHILS NFR BLD: 0 % (ref 0–1)
BUN SERPL-MCNC: 20 MG/DL (ref 6–20)
BUN/CREAT SERPL: 26 (ref 12–20)
CALCIUM SERPL-MCNC: 8.4 MG/DL (ref 8.5–10.1)
CALCULATED P AXIS, ECG09: 72 DEGREES
CALCULATED R AXIS, ECG10: 45 DEGREES
CALCULATED T AXIS, ECG11: 45 DEGREES
CHLORIDE SERPL-SCNC: 110 MMOL/L (ref 97–108)
CO2 SERPL-SCNC: 21 MMOL/L (ref 21–32)
CREAT SERPL-MCNC: 0.78 MG/DL (ref 0.55–1.02)
CRP SERPL-MCNC: 1.61 MG/DL (ref 0–0.6)
D DIMER PPP FEU-MCNC: 0.46 MG/L FEU (ref 0–0.65)
DIAGNOSIS, 93000: NORMAL
DIFFERENTIAL METHOD BLD: ABNORMAL
EOSINOPHIL # BLD: 0 K/UL (ref 0–0.4)
EOSINOPHIL NFR BLD: 0 % (ref 0–7)
ERYTHROCYTE [DISTWIDTH] IN BLOOD BY AUTOMATED COUNT: 12.1 % (ref 11.5–14.5)
GLUCOSE SERPL-MCNC: 193 MG/DL (ref 65–100)
HCT VFR BLD AUTO: 41.1 % (ref 35–47)
HGB BLD-MCNC: 14 G/DL (ref 11.5–16)
IMM GRANULOCYTES # BLD AUTO: 0.1 K/UL (ref 0–0.04)
IMM GRANULOCYTES NFR BLD AUTO: 1 % (ref 0–0.5)
LYMPHOCYTES # BLD: 1.3 K/UL (ref 0.8–3.5)
LYMPHOCYTES NFR BLD: 12 % (ref 12–49)
MCH RBC QN AUTO: 30.6 PG (ref 26–34)
MCHC RBC AUTO-ENTMCNC: 34.1 G/DL (ref 30–36.5)
MCV RBC AUTO: 89.7 FL (ref 80–99)
MONOCYTES # BLD: 0.6 K/UL (ref 0–1)
MONOCYTES NFR BLD: 6 % (ref 5–13)
NEUTS SEG # BLD: 8.6 K/UL (ref 1.8–8)
NEUTS SEG NFR BLD: 81 % (ref 32–75)
NRBC # BLD: 0 K/UL (ref 0–0.01)
NRBC BLD-RTO: 0 PER 100 WBC
P-R INTERVAL, ECG05: 134 MS
PLATELET # BLD AUTO: 364 K/UL (ref 150–400)
PMV BLD AUTO: 9.1 FL (ref 8.9–12.9)
POTASSIUM SERPL-SCNC: 3.9 MMOL/L (ref 3.5–5.1)
Q-T INTERVAL, ECG07: 420 MS
QRS DURATION, ECG06: 86 MS
QTC CALCULATION (BEZET), ECG08: 462 MS
RBC # BLD AUTO: 4.58 M/UL (ref 3.8–5.2)
SODIUM SERPL-SCNC: 141 MMOL/L (ref 136–145)
VENTRICULAR RATE, ECG03: 73 BPM
WBC # BLD AUTO: 10.6 K/UL (ref 3.6–11)

## 2021-12-28 PROCEDURE — 36415 COLL VENOUS BLD VENIPUNCTURE: CPT

## 2021-12-28 PROCEDURE — 94640 AIRWAY INHALATION TREATMENT: CPT

## 2021-12-28 PROCEDURE — 65270000029 HC RM PRIVATE

## 2021-12-28 PROCEDURE — 77010033678 HC OXYGEN DAILY

## 2021-12-28 PROCEDURE — 74011250636 HC RX REV CODE- 250/636: Performed by: INTERNAL MEDICINE

## 2021-12-28 PROCEDURE — 85379 FIBRIN DEGRADATION QUANT: CPT

## 2021-12-28 PROCEDURE — 94664 DEMO&/EVAL PT USE INHALER: CPT

## 2021-12-28 PROCEDURE — 2709999900 HC NON-CHARGEABLE SUPPLY

## 2021-12-28 PROCEDURE — 74011250637 HC RX REV CODE- 250/637: Performed by: INTERNAL MEDICINE

## 2021-12-28 PROCEDURE — 86140 C-REACTIVE PROTEIN: CPT

## 2021-12-28 PROCEDURE — 80048 BASIC METABOLIC PNL TOTAL CA: CPT

## 2021-12-28 PROCEDURE — 85025 COMPLETE CBC W/AUTO DIFF WBC: CPT

## 2021-12-28 RX ADMIN — Medication 1 CAPSULE: at 08:26

## 2021-12-28 RX ADMIN — LEVOTHYROXINE SODIUM 112 MCG: 0.11 TABLET ORAL at 08:26

## 2021-12-28 RX ADMIN — IPRATROPIUM BROMIDE AND ALBUTEROL 1 PUFF: 20; 100 SPRAY, METERED RESPIRATORY (INHALATION) at 01:03

## 2021-12-28 RX ADMIN — Medication 10 ML: at 21:51

## 2021-12-28 RX ADMIN — IPRATROPIUM BROMIDE AND ALBUTEROL 1 PUFF: 20; 100 SPRAY, METERED RESPIRATORY (INHALATION) at 23:06

## 2021-12-28 RX ADMIN — OXYCODONE HYDROCHLORIDE AND ACETAMINOPHEN 500 MG: 500 TABLET ORAL at 21:51

## 2021-12-28 RX ADMIN — DEXAMETHASONE SODIUM PHOSPHATE 6 MG: 4 INJECTION, SOLUTION INTRA-ARTICULAR; INTRALESIONAL; INTRAMUSCULAR; INTRAVENOUS; SOFT TISSUE at 08:27

## 2021-12-28 RX ADMIN — AMITRIPTYLINE HYDROCHLORIDE 30 MG: 10 TABLET, FILM COATED ORAL at 21:51

## 2021-12-28 RX ADMIN — ENOXAPARIN SODIUM 30 MG: 100 INJECTION SUBCUTANEOUS at 21:51

## 2021-12-28 RX ADMIN — Medication 10 ML: at 08:27

## 2021-12-28 RX ADMIN — OXYCODONE HYDROCHLORIDE AND ACETAMINOPHEN 500 MG: 500 TABLET ORAL at 08:26

## 2021-12-28 RX ADMIN — ZONISAMIDE 300 MG: 100 CAPSULE ORAL at 18:30

## 2021-12-28 RX ADMIN — IPRATROPIUM BROMIDE AND ALBUTEROL 1 PUFF: 20; 100 SPRAY, METERED RESPIRATORY (INHALATION) at 13:18

## 2021-12-28 RX ADMIN — BARICITINIB 4 MG: 2 TABLET, FILM COATED ORAL at 08:26

## 2021-12-28 RX ADMIN — DEXAMETHASONE SODIUM PHOSPHATE 6 MG: 4 INJECTION, SOLUTION INTRA-ARTICULAR; INTRALESIONAL; INTRAMUSCULAR; INTRAVENOUS; SOFT TISSUE at 21:51

## 2021-12-28 RX ADMIN — VALACYCLOVIR HYDROCHLORIDE 500 MG: 500 TABLET, FILM COATED ORAL at 08:26

## 2021-12-28 RX ADMIN — ENOXAPARIN SODIUM 30 MG: 100 INJECTION SUBCUTANEOUS at 08:27

## 2021-12-28 RX ADMIN — IPRATROPIUM BROMIDE AND ALBUTEROL 1 PUFF: 20; 100 SPRAY, METERED RESPIRATORY (INHALATION) at 07:51

## 2021-12-28 NOTE — PROGRESS NOTES
Ton Mckeon [facility] [address]  [address], [place], [address] [facility]  [phone]      Medical Progress Note      NAME: Devi Cruz   :  1960  MRM:  [id number]    Date/Time: 2021  12:42 PM         Subjective:     Chief Complaint: \"I feel a lot better\"     Pt seen and examined. Proning this AM. O2 requirements down. Feeling improved      ROS:  (bold if positive, if negative)      SOB improving   Weakness improving       Objective:       Vitals:          Last 24hrs VS reviewed since prior progress note.  Most recent are:    Visit Vitals  BP (!) 104/53   Pulse 75   Temp 97.6 °F (36.4 °C)   Resp 27   Ht 5' 9\" (1.753 m)   Wt 83.9 kg (185 lb)   SpO2 90%   BMI 27.32 kg/m²     SpO2 Readings from Last 6 Encounters:   21 90%   14 98%   13 98%   12 98%    O2 Flow Rate (L/min): 2 l/min       Intake/Output Summary (Last 24 hours) at 2021 1543  Last data filed at 2021 2300  Gross per 24 hour   Intake --   Output 500 ml   Net -500 ml          Exam:     Physical Exam:    Gen:  Well-developed, well-nourished, in no acute distress  HEENT:  Pink conjunctivae, PERRL, hearing intact to voice, moist mucous membranes  Neck:  Supple, without masses, thyroid non-tender  Resp: diffuse crackles   Card:  No murmurs, normal S1, S2 without thrills, bruits or peripheral edema  Abd:  Soft, non-tender, non-distended, normoactive bowel sounds are present  Musc:  No cyanosis or clubbing  Skin:  No rashes or ulcers, skin turgor is good  Neuro:  Cranial nerves 3-12 are grossly intact,  strength is 5/5 bilaterally and dorsi / plantarflexion is 5/5 bilaterally, follows commands appropriately  Psych:  Good insight, oriented to person, place and time, alert    Medications Reviewed: (see below)    Lab Data Reviewed: (see below)    ______________________________________________________________________    Medications:     Current Facility-Administered Medications   Medication Dose Route Frequency    ipratropium-albuterol (COMBIVENT RESPIMAT) 20 mcg-100 mcg inhalation spray  1 Puff Inhalation Q6H RT    sodium chloride (NS) flush 5-10 mL  5-10 mL IntraVENous PRN    sodium chloride (NS) flush 5-40 mL  5-40 mL IntraVENous Q8H    sodium chloride (NS) flush 5-40 mL  5-40 mL IntraVENous PRN    acetaminophen (TYLENOL) tablet 650 mg  650 mg Oral Q6H PRN    Or    acetaminophen (TYLENOL) suppository 650 mg  650 mg Rectal Q6H PRN    polyethylene glycol (MIRALAX) packet 17 g  17 g Oral DAILY PRN    promethazine (PHENERGAN) tablet 12.5 mg  12.5 mg Oral Q6H PRN    Or    ondansetron (ZOFRAN) injection 4 mg  4 mg IntraVENous Q6H PRN    enoxaparin (LOVENOX) injection 30 mg  30 mg SubCUTAneous Q12H    guaiFENesin-dextromethorphan (ROBITUSSIN DM) 100-10 mg/5 mL syrup 5 mL  5 mL Oral Q4H PRN    ascorbic acid (vitamin C) (VITAMIN C) tablet 500 mg  500 mg Oral BID    zinc sulfate (ZINCATE) 50 mg zinc (220 mg) capsule 1 Capsule  1 Capsule Oral DAILY    azithromycin (ZITHROMAX) 500 mg in 0.9% sodium chloride 250 mL (VIAL-MATE)  500 mg IntraVENous Q24H    benzonatate (TESSALON) capsule 100 mg  100 mg Oral TID PRN    dexamethasone (DECADRON) 4 mg/mL injection 6 mg  6 mg IntraVENous Q12H    baricitinib (OLUMIANT) tablet 4 mg  4 mg Oral DAILY    zonisamide (ZONEGRAN) capsule 300 mg  300 mg Oral DAILY    levothyroxine (SYNTHROID) tablet 112 mcg  112 mcg Oral 6am    amitriptyline (ELAVIL) tablet 30 mg  30 mg Oral QHS    valACYclovir (VALTREX) tablet 500 mg  500 mg Oral DAILY     Current Outpatient Medications   Medication Sig    valACYclovir (Valtrex) 500 mg tablet Take 500 mg by mouth daily.  tolterodine (DETROL) 2 mg tablet Take 2 mg by mouth two (2) times a day.  amitriptyline (ELAVIL) 10 mg tablet Take 30 mg by mouth nightly.  levothyroxine (LevoxyL) 112 mcg tablet Take 112 mcg by mouth Daily (before breakfast).  zonisamide (ZONEGRAN) 100 mg capsule Take 300 mg by mouth daily.             Lab Review:     Recent Labs     12/28/21  0840 12/27/21  0343 12/26/21  0839   WBC 10.6 2.8* 8.7   HGB 14.0 14.7 15.5   HCT 41.1 42.4 46.1    302 288     Recent Labs     12/28/21  0840 12/27/21  0343 12/26/21  0839    139 137   K 3.9 4.0 3.7   * 108 101   CO2 21 23 25   * 180* 132*   BUN 20 23* 20   CREA 0.78 0.72 0.98   CA 8.4* 8.4* 8.6   MG  --  2.4 2.2   PHOS  --   --  3.2   ALB  --   --  2.8*   ALT  --   --  34   INR  --   --  1.0     No components found for: GLPOC         Assessment / Plan:   Acute respiratory failure with hypoxia - unvaccinated. High risk for decompensation because of age, lack of vaccination status, CXR findings/degree of inflammatory marker elevation. PCP started her on ivermectin which has NOT been clinically proven to provide any benefit in covid and it is unclear that it may not even lead to harm as not routinely used in humans. STOP Ivermectin   -continue IV decadron and baricitinib   -vitamin C/zinc   -procalcitonin low; likely will stop azithro if remains low in AM   -d-dimer WNL; Lovenox for DVT prophylaxis   -incentive spirometry, OOB, prone; pt voice understanding   -trend inflammatory markers (CRP downtrending)   -wean O2; currently on 3L   -will 6MW prior to d/c to determine O2 needs        Seizure disorder ([facility] [address] (4/18/2013)  -continue outpt Zonegran        Hypothyroidism (4/18/2013)  -on levothyroxine    ? Dispo in the AM if continues to improve    Total time spent with patient: 30 [address] discussed with: Patient and Nursing Staff    Discussed:  Care Plan    Prophylaxis:  Lovenox    Disposition:  Home w/Family           ___________________________________________________    Attending Physician: Lashawn Umanzor MD

## 2021-12-28 NOTE — ED NOTES
Verbal shift change report given to Paolo Spencer RN (oncoming nurse) by Heath Price (offgoing nurse). Report included the following information SBAR, ED Summary, MAR and Recent Results.

## 2021-12-28 NOTE — ED NOTES
Assessment complete. Denies any complaints. Pt remains on oxygen at 6 l nc. Breath sounds diminished.

## 2021-12-28 NOTE — PROGRESS NOTES
Occupational Therapy Note:  Chart reviewed and spoke with covering RN; patient declining all therapy services at this time (PT & OT); states that she does not have mobility concerns,  has been ambulatory while inpatient, managing self care independently, and does not \"want to pay for it\". OT will complete orders in respect to patient wishes. Thank you.   Irma Browning, OTR/L

## 2021-12-28 NOTE — ED NOTES
Bedside and Verbal shift change report given Papo Bowden (oncoming nurse) by Simón Orta RN (offgoing nurse). Report included the following information SBAR and Kardex.

## 2021-12-28 NOTE — ED NOTES
Bedside and Verbal shift change report given to *Rand Puente (oncoming nurse) by Chloe Ruvalcaba (offgoing nurse). Report included the following information SBAR, Kardex, ED Summary and Recent Results.

## 2021-12-28 NOTE — ED NOTES
TRANSFER - OUT REPORT:    Verbal report given to 5th floor RN(name) on Ted Jackson  being transferred to 5th floor(unit) for routine progression of care       Report consisted of patients Situation, Background, Assessment and   Recommendations(SBAR). Information from the following report(s) SBAR, Kardex, ED Summary, Intake/Output, MAR, Recent Results and Cardiac Rhythm NSR was reviewed with the receiving nurse. Lines:   Peripheral IV 12/26/21 Left Antecubital (Active)   Site Assessment Clean, dry, & intact 12/27/21 0800   Phlebitis Assessment 0 12/27/21 0800   Infiltration Assessment 0 12/27/21 0800   Dressing Status Clean, dry, & intact 12/27/21 0800   Dressing Type Tape;Transparent 12/27/21 0800   Hub Color/Line Status Pink;Flushed;Patent 12/27/21 0800   Action Taken Blood drawn 12/27/21 0800        Opportunity for questions and clarification was provided.       Patient transported with:   Monitor  O2 @ 2 liters  Tech

## 2021-12-28 NOTE — PROGRESS NOTES
Physical therapy note:    Chart reviewed in preparation for physical therapy treatment. Spoke with covering RN; patient declining all therapy services at this time (PT & OT); states that she does not have mobility concerns,  has been ambulatory while inpatient & does not \"want to pay for it\". PT will complete orders in respect to patient wishes.      Thank you,  Laura Nielson, PT, DPT

## 2021-12-29 ENCOUNTER — APPOINTMENT (OUTPATIENT)
Dept: GENERAL RADIOLOGY | Age: 61
DRG: 177 | End: 2021-12-29
Attending: INTERNAL MEDICINE
Payer: COMMERCIAL

## 2021-12-29 LAB
ANION GAP SERPL CALC-SCNC: 10 MMOL/L (ref 5–15)
BASOPHILS # BLD: 0 K/UL (ref 0–0.1)
BASOPHILS NFR BLD: 0 % (ref 0–1)
BNP SERPL-MCNC: 364 PG/ML
BUN SERPL-MCNC: 22 MG/DL (ref 6–20)
BUN/CREAT SERPL: 30 (ref 12–20)
CALCIUM SERPL-MCNC: 8.5 MG/DL (ref 8.5–10.1)
CHLORIDE SERPL-SCNC: 112 MMOL/L (ref 97–108)
CO2 SERPL-SCNC: 20 MMOL/L (ref 21–32)
CREAT SERPL-MCNC: 0.73 MG/DL (ref 0.55–1.02)
CRP SERPL-MCNC: 1.04 MG/DL (ref 0–0.6)
D DIMER PPP FEU-MCNC: 0.5 MG/L FEU (ref 0–0.65)
DIFFERENTIAL METHOD BLD: ABNORMAL
EOSINOPHIL # BLD: 0 K/UL (ref 0–0.4)
EOSINOPHIL NFR BLD: 0 % (ref 0–7)
ERYTHROCYTE [DISTWIDTH] IN BLOOD BY AUTOMATED COUNT: 11.9 % (ref 11.5–14.5)
GLUCOSE SERPL-MCNC: 147 MG/DL (ref 65–100)
HCT VFR BLD AUTO: 41.6 % (ref 35–47)
HGB BLD-MCNC: 14.3 G/DL (ref 11.5–16)
IMM GRANULOCYTES # BLD AUTO: 0.2 K/UL (ref 0–0.04)
IMM GRANULOCYTES NFR BLD AUTO: 2 % (ref 0–0.5)
LYMPHOCYTES # BLD: 0.9 K/UL (ref 0.8–3.5)
LYMPHOCYTES NFR BLD: 7 % (ref 12–49)
MCH RBC QN AUTO: 31 PG (ref 26–34)
MCHC RBC AUTO-ENTMCNC: 34.4 G/DL (ref 30–36.5)
MCV RBC AUTO: 90 FL (ref 80–99)
MONOCYTES # BLD: 0.8 K/UL (ref 0–1)
MONOCYTES NFR BLD: 5 % (ref 5–13)
NEUTS SEG # BLD: 12.4 K/UL (ref 1.8–8)
NEUTS SEG NFR BLD: 86 % (ref 32–75)
NRBC # BLD: 0 K/UL (ref 0–0.01)
NRBC BLD-RTO: 0 PER 100 WBC
PLATELET # BLD AUTO: 402 K/UL (ref 150–400)
PMV BLD AUTO: 9 FL (ref 8.9–12.9)
POTASSIUM SERPL-SCNC: 4.2 MMOL/L (ref 3.5–5.1)
PROCALCITONIN SERPL-MCNC: <0.05 NG/ML
RBC # BLD AUTO: 4.62 M/UL (ref 3.8–5.2)
SODIUM SERPL-SCNC: 142 MMOL/L (ref 136–145)
WBC # BLD AUTO: 14.3 K/UL (ref 3.6–11)

## 2021-12-29 PROCEDURE — 77010033711 HC HIGH FLOW OXYGEN

## 2021-12-29 PROCEDURE — 85025 COMPLETE CBC W/AUTO DIFF WBC: CPT

## 2021-12-29 PROCEDURE — 65270000029 HC RM PRIVATE

## 2021-12-29 PROCEDURE — 85379 FIBRIN DEGRADATION QUANT: CPT

## 2021-12-29 PROCEDURE — 71045 X-RAY EXAM CHEST 1 VIEW: CPT

## 2021-12-29 PROCEDURE — 77010033678 HC OXYGEN DAILY

## 2021-12-29 PROCEDURE — 94640 AIRWAY INHALATION TREATMENT: CPT

## 2021-12-29 PROCEDURE — 80048 BASIC METABOLIC PNL TOTAL CA: CPT

## 2021-12-29 PROCEDURE — 74011250637 HC RX REV CODE- 250/637: Performed by: INTERNAL MEDICINE

## 2021-12-29 PROCEDURE — 74011250636 HC RX REV CODE- 250/636: Performed by: INTERNAL MEDICINE

## 2021-12-29 PROCEDURE — 77030038269 HC DRN EXT URIN PURWCK BARD -A

## 2021-12-29 PROCEDURE — 84145 PROCALCITONIN (PCT): CPT

## 2021-12-29 PROCEDURE — 94760 N-INVAS EAR/PLS OXIMETRY 1: CPT

## 2021-12-29 PROCEDURE — 86140 C-REACTIVE PROTEIN: CPT

## 2021-12-29 PROCEDURE — 36415 COLL VENOUS BLD VENIPUNCTURE: CPT

## 2021-12-29 PROCEDURE — 83880 ASSAY OF NATRIURETIC PEPTIDE: CPT

## 2021-12-29 PROCEDURE — 94761 N-INVAS EAR/PLS OXIMETRY MLT: CPT

## 2021-12-29 PROCEDURE — 94664 DEMO&/EVAL PT USE INHALER: CPT

## 2021-12-29 RX ORDER — AMOXICILLIN AND CLAVULANATE POTASSIUM 875; 125 MG/1; MG/1
1 TABLET, FILM COATED ORAL EVERY 12 HOURS
Status: DISCONTINUED | OUTPATIENT
Start: 2021-12-29 | End: 2021-12-31 | Stop reason: HOSPADM

## 2021-12-29 RX ORDER — ALPRAZOLAM 0.5 MG/1
0.25 TABLET ORAL
Status: DISCONTINUED | OUTPATIENT
Start: 2021-12-29 | End: 2021-12-30

## 2021-12-29 RX ORDER — FUROSEMIDE 20 MG/1
20 TABLET ORAL DAILY
Status: DISCONTINUED | OUTPATIENT
Start: 2021-12-29 | End: 2021-12-31 | Stop reason: HOSPADM

## 2021-12-29 RX ADMIN — ENOXAPARIN SODIUM 30 MG: 100 INJECTION SUBCUTANEOUS at 08:06

## 2021-12-29 RX ADMIN — Medication 10 ML: at 21:21

## 2021-12-29 RX ADMIN — ZONISAMIDE 300 MG: 100 CAPSULE ORAL at 17:36

## 2021-12-29 RX ADMIN — ALPRAZOLAM 0.25 MG: 0.5 TABLET ORAL at 22:34

## 2021-12-29 RX ADMIN — ENOXAPARIN SODIUM 30 MG: 100 INJECTION SUBCUTANEOUS at 21:20

## 2021-12-29 RX ADMIN — OXYCODONE HYDROCHLORIDE AND ACETAMINOPHEN 500 MG: 500 TABLET ORAL at 21:20

## 2021-12-29 RX ADMIN — FUROSEMIDE 20 MG: 20 TABLET ORAL at 10:30

## 2021-12-29 RX ADMIN — LEVOTHYROXINE SODIUM 112 MCG: 0.11 TABLET ORAL at 07:31

## 2021-12-29 RX ADMIN — DEXAMETHASONE SODIUM PHOSPHATE 6 MG: 4 INJECTION, SOLUTION INTRA-ARTICULAR; INTRALESIONAL; INTRAMUSCULAR; INTRAVENOUS; SOFT TISSUE at 08:06

## 2021-12-29 RX ADMIN — ALPRAZOLAM 0.25 MG: 0.5 TABLET ORAL at 10:30

## 2021-12-29 RX ADMIN — DEXAMETHASONE SODIUM PHOSPHATE 6 MG: 4 INJECTION, SOLUTION INTRA-ARTICULAR; INTRALESIONAL; INTRAMUSCULAR; INTRAVENOUS; SOFT TISSUE at 21:20

## 2021-12-29 RX ADMIN — AZITHROMYCIN MONOHYDRATE 500 MG: 500 INJECTION, POWDER, LYOPHILIZED, FOR SOLUTION INTRAVENOUS at 12:46

## 2021-12-29 RX ADMIN — IPRATROPIUM BROMIDE AND ALBUTEROL 1 PUFF: 20; 100 SPRAY, METERED RESPIRATORY (INHALATION) at 13:39

## 2021-12-29 RX ADMIN — BARICITINIB 4 MG: 2 TABLET, FILM COATED ORAL at 08:06

## 2021-12-29 RX ADMIN — Medication 10 ML: at 14:37

## 2021-12-29 RX ADMIN — IPRATROPIUM BROMIDE AND ALBUTEROL 1 PUFF: 20; 100 SPRAY, METERED RESPIRATORY (INHALATION) at 20:39

## 2021-12-29 RX ADMIN — Medication 1 CAPSULE: at 08:06

## 2021-12-29 RX ADMIN — Medication 10 ML: at 07:31

## 2021-12-29 RX ADMIN — AMOXICILLIN AND CLAVULANATE POTASSIUM 1 TABLET: 875; 125 TABLET, FILM COATED ORAL at 19:37

## 2021-12-29 RX ADMIN — IPRATROPIUM BROMIDE AND ALBUTEROL 1 PUFF: 20; 100 SPRAY, METERED RESPIRATORY (INHALATION) at 06:55

## 2021-12-29 RX ADMIN — AMITRIPTYLINE HYDROCHLORIDE 30 MG: 10 TABLET, FILM COATED ORAL at 21:20

## 2021-12-29 RX ADMIN — OXYCODONE HYDROCHLORIDE AND ACETAMINOPHEN 500 MG: 500 TABLET ORAL at 08:06

## 2021-12-29 NOTE — PROGRESS NOTES
Chart accessed for 334 room assignment. Star Wedge Flap Text: The defect edges were debeveled with a #15 scalpel blade.  Given the location of the defect, shape of the defect and the proximity to free margins a star wedge flap was deemed most appropriate.  Using a sterile surgical marker, an appropriate rotation flap was drawn incorporating the defect and placing the expected incisions within the relaxed skin tension lines where possible. The area thus outlined was incised deep to adipose tissue with a #15 scalpel blade.  The skin margins were undermined to an appropriate distance in all directions utilizing iris scissors.

## 2021-12-29 NOTE — PROGRESS NOTES
1515 TRANSFER - IN REPORT:    Verbal report received from [facility] ED RN (name) on Ledy Healy  being received from ED(unit) for routine progression of care      Report consisted of patients Situation, Background, Assessment and   Recommendations(SBAR). Information from the following report(s) SBAR, ED Summary, Intake/Output, Recent Results and Cardiac Rhythm NSR was reviewed with the receiving nurse. Opportunity for questions and clarification was provided. Assessment completed upon patients arrival to unit and care assumed.

## 2021-12-29 NOTE — PROGRESS NOTES
Received order for RT-Evaluation for home oxygen needs. Patient is currently on mid flow nasal cannula @ 10l/m. Will perform walk closer to discharge.

## 2021-12-29 NOTE — PROGRESS NOTES
12/29/2021   CARE MANAGEMENT NOTE:  CM reviewed EMR for clinical updates and handoff received from ER  (Tana Jacobs.). Pt was admitted with COVID. Reportedly, pt resides with her  lEiana Zarate ([phone]). RUR 7%    Transition Plan of Care:  1. Plan is for pt to return home with her   2. PT/OT progress will be monitored  3. Home oxygen eval will be needed prior to discharge  4. Outpt f/u  5.  will transport pt home    CM will continue to follow pt until discharged.   Ayden

## 2021-12-29 NOTE — PROGRESS NOTES
Ton [facility] [place] [address]  [address] [facility], [address] [place] [address]  [phone]      Medical Progress Note      NAME: Sarmad Russell   :  1960  MRM:  [id number]    Date/Time: 2021  12:42 PM         Subjective:     Chief Complaint: Yeni Dills I go home? \"     Pt seen and examined. This AM attempted to get pt to the edge of the bed and desat'ed dramatically requiring 10L     ROS:  (bold if positive, if negative)      SOB improving   Weakness improving       Objective:       Vitals:          Last 24hrs VS reviewed since prior progress note.  Most recent are:    Visit Vitals  BP (!) 111/59 (BP 1 Location: Right upper arm, BP Patient Position: At rest)   Pulse 77   Temp 97.3 °F (36.3 °C)   Resp 22   Ht 5' 9\" (1.753 m)   Wt 83.9 kg (185 lb)   SpO2 95%   BMI 27.32 kg/m²     SpO2 Readings from Last 6 Encounters:   21 95%   14 98%   13 98%   12 98%    O2 Flow Rate (L/min): 6 l/min       Intake/Output Summary (Last 24 hours) at 2021 1748  Last data filed at 2021 1437  Gross per 24 hour   Intake 300 ml   Output 1700 ml   Net -1400 ml          Exam:     Physical Exam:    Gen:  Well-developed, well-nourished, in no acute distress  HEENT:  Pink conjunctivae, PERRL, hearing intact to voice, moist mucous membranes  Neck:  Supple, without masses, thyroid non-tender  Resp: diffuse crackles   Card:  No murmurs, normal S1, S2 without thrills, bruits or peripheral edema  Abd:  Soft, non-tender, non-distended, normoactive bowel sounds are present  Musc:  No cyanosis or clubbing  Skin:  No rashes or ulcers, skin turgor is good  Neuro:  Cranial nerves 3-12 are grossly intact,  strength is 5/5 bilaterally and dorsi / plantarflexion is 5/5 bilaterally, follows commands appropriately  Psych:  Good insight, oriented to person, place and time, alert    Medications Reviewed: (see below)    Lab Data Reviewed: (see below)    ______________________________________________________________________    Medications:     Current Facility-Administered Medications   Medication Dose Route Frequency    furosemide (LASIX) tablet 20 mg  20 mg Oral DAILY    ALPRAZolam (XANAX) tablet 0.25 mg  0.25 mg Oral QID PRN    ipratropium-albuterol (COMBIVENT RESPIMAT) 20 mcg-100 mcg inhalation spray  1 Puff Inhalation Q6H RT    sodium chloride (NS) flush 5-10 mL  5-10 mL IntraVENous PRN    sodium chloride (NS) flush 5-40 mL  5-40 mL IntraVENous Q8H    sodium chloride (NS) flush 5-40 mL  5-40 mL IntraVENous PRN    acetaminophen (TYLENOL) tablet 650 mg  650 mg Oral Q6H PRN    Or    acetaminophen (TYLENOL) suppository 650 mg  650 mg Rectal Q6H PRN    polyethylene glycol (MIRALAX) packet 17 g  17 g Oral DAILY PRN    promethazine (PHENERGAN) tablet 12.5 mg  12.5 mg Oral Q6H PRN    Or    ondansetron (ZOFRAN) injection 4 mg  4 mg IntraVENous Q6H PRN    enoxaparin (LOVENOX) injection 30 mg  30 mg SubCUTAneous Q12H    guaiFENesin-dextromethorphan (ROBITUSSIN DM) 100-10 mg/5 mL syrup 5 mL  5 mL Oral Q4H PRN    ascorbic acid (vitamin C) (VITAMIN C) tablet 500 mg  500 mg Oral BID    zinc sulfate (ZINCATE) 50 mg zinc (220 mg) capsule 1 Capsule  1 Capsule Oral DAILY    azithromycin (ZITHROMAX) 500 mg in 0.9% sodium chloride 250 mL (VIAL-MATE)  500 mg IntraVENous Q24H    benzonatate (TESSALON) capsule 100 mg  100 mg Oral TID PRN    dexamethasone (DECADRON) 4 mg/mL injection 6 mg  6 mg IntraVENous Q12H    baricitinib (OLUMIANT) tablet 4 mg  4 mg Oral DAILY    zonisamide (ZONEGRAN) capsule 300 mg  300 mg Oral DAILY    levothyroxine (SYNTHROID) tablet 112 mcg  112 mcg Oral 6am    amitriptyline (ELAVIL) tablet 30 mg  30 mg Oral QHS    [Held by provider] valACYclovir (VALTREX) tablet 500 mg  500 mg Oral DAILY            Lab Review:     Recent Labs     12/29/21  0038 12/28/21  0840 12/27/21  0343   WBC 14.3* 10.6 2.8*   HGB 14.3 14.0 14.7   HCT 41.6 41.1 42.4   * 364 302     Recent Labs     12/29/21  0038 12/28/21  0840 12/27/21  0343    141 139   K 4.2 3.9 4.0   * 110* 108   CO2 20* 21 23   * 193* 180*   BUN 22* 20 23*   CREA 0.73 0.78 0.72   CA 8.5 8.4* 8.4*   MG  --   --  2.4     No components found for: GLPOC         Assessment / Plan:   Acute respiratory failure with hypoxia - unvaccinated. High risk for decompensation because of age, lack of vaccination status, CXR findings/degree of inflammatory marker elevation. PCP started her on ivermectin which has NOT been clinically proven to provide any benefit in covid and it is unclear that it may not even lead to harm as not routinely used in humans. STOP Ivermectin   -continue IV decadron and baricitinib   -vitamin C/zinc   -procalcitonin low; continue azithro for now   -d-dimer WNL; Lovenox for DVT prophylaxis   -incentive spirometry, OOB, prone; pt voice understanding   -trend inflammatory markers (CRP continues to downtrend)   -repeat CXR obtained considering decompensation. Does appear slightly evolved on the R side. Start diuretics.  Will add Augmentin though unlikely aspiration PNA/pneumonitis will err on the side of caution       Seizure disorder ([facility] [address] (4/18/2013)  -continue outpt Zonegran        Hypothyroidism (4/18/2013)  -on levothyroxine    Total time spent with patient: 35 [address] discussed with: Patient and Nursing Staff    Discussed:  Care Plan    Prophylaxis:  Lovenox    Disposition:  Home w/Family           ___________________________________________________    Attending Physician: Gaby Esquivel MD

## 2021-12-29 NOTE — PROGRESS NOTES
2100  Patient called out saying her oxygen wasn't working. RN checked on patient. Nasal cannula was taken off. Nasal cannula was still attached to an O2 tank from her transfer to floor, tank was now empty. RN checked patient's O2 sat: 85%. RN immediately replaced nasal cannula and attached it to central O2 starting at 4L. O2 sat not increasing above 88% so O2 increased to 6L. Patient's O2 now up to 92%    0730  Bedside and Verbal shift change report given to Cordell Torres RN (oncoming nurse) by Tavares De Jesus RN (offgoing nurse). Report included the following information SBAR, Kardex, Intake/Output, MAR, Accordion, Recent Results, Med Rec Status and Cardiac Rhythm NSR. Candice Rivers

## 2021-12-30 LAB
ALBUMIN SERPL-MCNC: 2.7 G/DL (ref 3.5–5)
ALBUMIN/GLOB SERPL: 0.7 {RATIO} (ref 1.1–2.2)
ALP SERPL-CCNC: 66 U/L (ref 45–117)
ALT SERPL-CCNC: 40 U/L (ref 12–78)
ANION GAP SERPL CALC-SCNC: 9 MMOL/L (ref 5–15)
AST SERPL-CCNC: 28 U/L (ref 15–37)
BASOPHILS # BLD: 0 K/UL (ref 0–0.1)
BASOPHILS NFR BLD: 0 % (ref 0–1)
BILIRUB SERPL-MCNC: 0.7 MG/DL (ref 0.2–1)
BUN SERPL-MCNC: 18 MG/DL (ref 6–20)
BUN/CREAT SERPL: 22 (ref 12–20)
CALCIUM SERPL-MCNC: 8.8 MG/DL (ref 8.5–10.1)
CHLORIDE SERPL-SCNC: 106 MMOL/L (ref 97–108)
CO2 SERPL-SCNC: 24 MMOL/L (ref 21–32)
CREAT SERPL-MCNC: 0.83 MG/DL (ref 0.55–1.02)
CRP SERPL-MCNC: 0.77 MG/DL (ref 0–0.6)
D DIMER PPP FEU-MCNC: 0.4 MG/L FEU (ref 0–0.65)
DIFFERENTIAL METHOD BLD: ABNORMAL
EOSINOPHIL # BLD: 0 K/UL (ref 0–0.4)
EOSINOPHIL NFR BLD: 0 % (ref 0–7)
ERYTHROCYTE [DISTWIDTH] IN BLOOD BY AUTOMATED COUNT: 11.9 % (ref 11.5–14.5)
GLOBULIN SER CALC-MCNC: 3.8 G/DL (ref 2–4)
GLUCOSE SERPL-MCNC: 195 MG/DL (ref 65–100)
HCT VFR BLD AUTO: 42.6 % (ref 35–47)
HGB BLD-MCNC: 14.9 G/DL (ref 11.5–16)
IMM GRANULOCYTES # BLD AUTO: 0 K/UL (ref 0–0.04)
IMM GRANULOCYTES NFR BLD AUTO: 0 % (ref 0–0.5)
LYMPHOCYTES # BLD: 1.6 K/UL (ref 0.8–3.5)
LYMPHOCYTES NFR BLD: 12 % (ref 12–49)
MAGNESIUM SERPL-MCNC: 2.2 MG/DL (ref 1.6–2.4)
MCH RBC QN AUTO: 31.1 PG (ref 26–34)
MCHC RBC AUTO-ENTMCNC: 35 G/DL (ref 30–36.5)
MCV RBC AUTO: 88.9 FL (ref 80–99)
MONOCYTES # BLD: 0.8 K/UL (ref 0–1)
MONOCYTES NFR BLD: 6 % (ref 5–13)
NEUTS BAND NFR BLD MANUAL: 1 %
NEUTS SEG # BLD: 10.7 K/UL (ref 1.8–8)
NEUTS SEG NFR BLD: 81 % (ref 32–75)
NRBC # BLD: 0 K/UL (ref 0–0.01)
NRBC BLD-RTO: 0 PER 100 WBC
PLATELET # BLD AUTO: 456 K/UL (ref 150–400)
PMV BLD AUTO: 9.1 FL (ref 8.9–12.9)
POTASSIUM SERPL-SCNC: 4 MMOL/L (ref 3.5–5.1)
PROT SERPL-MCNC: 6.5 G/DL (ref 6.4–8.2)
RBC # BLD AUTO: 4.79 M/UL (ref 3.8–5.2)
RBC MORPH BLD: ABNORMAL
SODIUM SERPL-SCNC: 139 MMOL/L (ref 136–145)
WBC # BLD AUTO: 13.1 K/UL (ref 3.6–11)

## 2021-12-30 PROCEDURE — 94760 N-INVAS EAR/PLS OXIMETRY 1: CPT

## 2021-12-30 PROCEDURE — 74011250636 HC RX REV CODE- 250/636: Performed by: NURSE PRACTITIONER

## 2021-12-30 PROCEDURE — 94640 AIRWAY INHALATION TREATMENT: CPT

## 2021-12-30 PROCEDURE — 77010033678 HC OXYGEN DAILY

## 2021-12-30 PROCEDURE — 85379 FIBRIN DEGRADATION QUANT: CPT

## 2021-12-30 PROCEDURE — 65270000029 HC RM PRIVATE

## 2021-12-30 PROCEDURE — 74011250637 HC RX REV CODE- 250/637: Performed by: INTERNAL MEDICINE

## 2021-12-30 PROCEDURE — 83735 ASSAY OF MAGNESIUM: CPT

## 2021-12-30 PROCEDURE — 36415 COLL VENOUS BLD VENIPUNCTURE: CPT

## 2021-12-30 PROCEDURE — 86140 C-REACTIVE PROTEIN: CPT

## 2021-12-30 PROCEDURE — 85025 COMPLETE CBC W/AUTO DIFF WBC: CPT

## 2021-12-30 PROCEDURE — 74011250636 HC RX REV CODE- 250/636: Performed by: INTERNAL MEDICINE

## 2021-12-30 PROCEDURE — 2709999900 HC NON-CHARGEABLE SUPPLY

## 2021-12-30 PROCEDURE — 80053 COMPREHEN METABOLIC PANEL: CPT

## 2021-12-30 RX ORDER — ALPRAZOLAM 0.5 MG/1
0.5 TABLET ORAL
Status: DISCONTINUED | OUTPATIENT
Start: 2021-12-30 | End: 2021-12-31 | Stop reason: HOSPADM

## 2021-12-30 RX ORDER — DEXAMETHASONE SODIUM PHOSPHATE 4 MG/ML
10 INJECTION, SOLUTION INTRA-ARTICULAR; INTRALESIONAL; INTRAMUSCULAR; INTRAVENOUS; SOFT TISSUE EVERY 12 HOURS
Status: DISCONTINUED | OUTPATIENT
Start: 2021-12-30 | End: 2021-12-31

## 2021-12-30 RX ADMIN — AMOXICILLIN AND CLAVULANATE POTASSIUM 1 TABLET: 875; 125 TABLET, FILM COATED ORAL at 08:30

## 2021-12-30 RX ADMIN — Medication 10 ML: at 22:05

## 2021-12-30 RX ADMIN — ALPRAZOLAM 0.25 MG: 0.5 TABLET ORAL at 08:30

## 2021-12-30 RX ADMIN — ENOXAPARIN SODIUM 30 MG: 100 INJECTION SUBCUTANEOUS at 22:03

## 2021-12-30 RX ADMIN — AMOXICILLIN AND CLAVULANATE POTASSIUM 1 TABLET: 875; 125 TABLET, FILM COATED ORAL at 22:03

## 2021-12-30 RX ADMIN — DEXAMETHASONE SODIUM PHOSPHATE 6 MG: 4 INJECTION, SOLUTION INTRA-ARTICULAR; INTRALESIONAL; INTRAMUSCULAR; INTRAVENOUS; SOFT TISSUE at 08:29

## 2021-12-30 RX ADMIN — ZONISAMIDE 300 MG: 100 CAPSULE ORAL at 18:02

## 2021-12-30 RX ADMIN — IPRATROPIUM BROMIDE AND ALBUTEROL 1 PUFF: 20; 100 SPRAY, METERED RESPIRATORY (INHALATION) at 21:04

## 2021-12-30 RX ADMIN — BARICITINIB 4 MG: 2 TABLET, FILM COATED ORAL at 08:29

## 2021-12-30 RX ADMIN — OXYCODONE HYDROCHLORIDE AND ACETAMINOPHEN 500 MG: 500 TABLET ORAL at 08:30

## 2021-12-30 RX ADMIN — IPRATROPIUM BROMIDE AND ALBUTEROL 1 PUFF: 20; 100 SPRAY, METERED RESPIRATORY (INHALATION) at 02:45

## 2021-12-30 RX ADMIN — IPRATROPIUM BROMIDE AND ALBUTEROL 1 PUFF: 20; 100 SPRAY, METERED RESPIRATORY (INHALATION) at 13:21

## 2021-12-30 RX ADMIN — ENOXAPARIN SODIUM 30 MG: 100 INJECTION SUBCUTANEOUS at 08:29

## 2021-12-30 RX ADMIN — ALPRAZOLAM 0.5 MG: 0.5 TABLET ORAL at 22:03

## 2021-12-30 RX ADMIN — Medication 10 ML: at 22:04

## 2021-12-30 RX ADMIN — IPRATROPIUM BROMIDE AND ALBUTEROL 1 PUFF: 20; 100 SPRAY, METERED RESPIRATORY (INHALATION) at 08:36

## 2021-12-30 RX ADMIN — Medication 1 CAPSULE: at 08:29

## 2021-12-30 RX ADMIN — OXYCODONE HYDROCHLORIDE AND ACETAMINOPHEN 500 MG: 500 TABLET ORAL at 22:03

## 2021-12-30 RX ADMIN — AZITHROMYCIN MONOHYDRATE 500 MG: 500 INJECTION, POWDER, LYOPHILIZED, FOR SOLUTION INTRAVENOUS at 15:01

## 2021-12-30 RX ADMIN — LEVOTHYROXINE SODIUM 112 MCG: 0.11 TABLET ORAL at 05:21

## 2021-12-30 RX ADMIN — DEXAMETHASONE SODIUM PHOSPHATE 10 MG: 4 INJECTION, SOLUTION INTRAMUSCULAR; INTRAVENOUS at 22:04

## 2021-12-30 RX ADMIN — Medication 10 ML: at 15:04

## 2021-12-30 RX ADMIN — AMITRIPTYLINE HYDROCHLORIDE 30 MG: 10 TABLET, FILM COATED ORAL at 22:03

## 2021-12-30 RX ADMIN — FUROSEMIDE 20 MG: 20 TABLET ORAL at 08:29

## 2021-12-30 NOTE — PROGRESS NOTES
Spiritual Care Assessment/Progress Note  [address]      NAME: Suzanne Mccall      MRN: [id number]  AGE: 62 y.o. SEX: female  Scientology Affiliation: No Yarsanism   Language: English     12/30/2021     Total Time (in minutes): 10     Spiritual Assessment begun in [facility]  MED SURG 2 through conversation with:         []Patient        [] Family    [] Friend(s)        Reason for Consult: Initial visit     Spiritual beliefs: (Please include comment if needed)     [] Identifies with a rocky tradition:         [] Supported by a rocky community:            [] Claims no spiritual orientation:           [] Seeking spiritual identity:                [] Adheres to an individual form of spirituality:           [x] Not able to assess:                           Identified resources for coping:      [] Prayer                               [] Music                  [] Guided Imagery     [] Family/friends                 [] Pet visits     [] Devotional reading                         [x] Unknown     [] Other:                                              Interventions offered during this visit: (See comments for more details)    Patient Interventions: Initial visit           Plan of Care:     [] Support spiritual and/or cultural needs    [] Support AMD and/or advance care planning process      [] Support grieving process   [] Coordinate Rites and/or Rituals    [] Coordination with community clergy   [] No spiritual needs identified at this time   [] Detailed Plan of Care below (See Comments)  [] Make referral to Music Therapy  [] Make referral to Pet Therapy     [] Make referral to Addiction services  [] Make referral to [facility]  [] Make referral to Spiritual Care Partner  [] No future visits requested        [x] Contact Spiritual Care for further referrals     Attempted visit for initial spiritual assessment. Unable to visit patient at this time due to Covid isolation precautions. Pt's chart was consulted.   Patti Alvarez Susan Baum MDiv, MS, [facility]

## 2021-12-30 NOTE — PROGRESS NOTES
BEDSIDE_VERBAL_RECORDED_WRITTEN:00453::\"Bedside\"} shift change report given to Norman Cleveland (oncoming nurse) by Krunal Sidhu (offgoing nurse). Report included the following information SBAR, Kardex, ED Summary, Procedure Summary, Intake/Output, MAR, Recent Results and Med Rec Status; cardiac rhythm sinus rhythm. Pulse ox drops at times to below 90%. Phlebotomist to draw morning labs not able to obtain by this author. See phlebotomy list..

## 2021-12-30 NOTE — CONSULTS
Name: Gurmeet Tanner 112: [address]   : 1960 Admit Date: 2021   Phone: [phone]  Room: [facility]   PCP: Michelle Berger MD  MRN: [id number]   Date: 2021  Code: Full Code        HPI:    11:27 AM       History was obtained from EMR, patient. I was asked by Dr. Demetrice Recinos to see Samreen Grief in consultation for a chief complaint of worsening hypoxia, COVID. History of Present Illness: 62year old female with a history of seizure disorder and hypothyroidism who was admitted to [facility] on 21 for acute hypoxic respiratory failure due to COVID-19. She is unvaccinated. Prior to admission, she says her symptoms started ~ 4 days prior to admission, diagnosed with covid-19 and was given ivermectin by her PCP. She has progressive worsening symptoms that prompted her visit to [facility] ED, then admitted to [facility]. During this admission, she has continued on dexamethasone, bariticinib, diuresis, empiric antibiotics. Due to increased o2 requirement on 21 up to 10L midflow o2, pulmonary was consulted for further evaluation. Currently she is on 6LNC o2, with o2 desats with activity. She is upset she cannot go home yet. Images:  21 cxr: redemonstrated bilateral peripheral airspace disease, right lung worse than left with mild interval worsening of the LLL compared to last cxr done on 21. Past Medical History:   Diagnosis Date    Autoimmune disease ([address]     hypothroidism    Neurological disorder     seizures       Past Surgical History:   Procedure Laterality Date    HX ORTHOPAEDIC      ruptureed disc       History reviewed. No pertinent family history. Denies any known lung disease in her family. Social History     Tobacco Use    Smoking status: Former Smoker    Smokeless tobacco: Current User   Substance Use Topics    Alcohol use: Yes     Comment: rarely   Former smoker, quit 20 years ago, up to 1/2ppd x 20 years. She vapes.    Denies any etoh or illicit drug use. No Known Allergies    Current Facility-Administered Medications   Medication Dose Route Frequency    furosemide (LASIX) tablet 20 mg  20 mg Oral DAILY    ALPRAZolam (XANAX) tablet 0.25 mg  0.25 mg Oral QID PRN    amoxicillin-clavulanate (AUGMENTIN) 875-125 mg per tablet 1 Tablet  1 Tablet Oral Q12H    ipratropium-albuterol (COMBIVENT RESPIMAT) 20 mcg-100 mcg inhalation spray  1 Puff Inhalation Q6H RT    sodium chloride (NS) flush 5-10 mL  5-10 mL IntraVENous PRN    sodium chloride (NS) flush 5-40 mL  5-40 mL IntraVENous Q8H    sodium chloride (NS) flush 5-40 mL  5-40 mL IntraVENous PRN    acetaminophen (TYLENOL) tablet 650 mg  650 mg Oral Q6H PRN    Or    acetaminophen (TYLENOL) suppository 650 mg  650 mg Rectal Q6H PRN    polyethylene glycol (MIRALAX) packet 17 g  17 g Oral DAILY PRN    promethazine (PHENERGAN) tablet 12.5 mg  12.5 mg Oral Q6H PRN    Or    ondansetron (ZOFRAN) injection 4 mg  4 mg IntraVENous Q6H PRN    enoxaparin (LOVENOX) injection 30 mg  30 mg SubCUTAneous Q12H    guaiFENesin-dextromethorphan (ROBITUSSIN DM) 100-10 mg/5 mL syrup 5 mL  5 mL Oral Q4H PRN    ascorbic acid (vitamin C) (VITAMIN C) tablet 500 mg  500 mg Oral BID    zinc sulfate (ZINCATE) 50 mg zinc (220 mg) capsule 1 Capsule  1 Capsule Oral DAILY    azithromycin (ZITHROMAX) 500 mg in 0.9% sodium chloride 250 mL (VIAL-MATE)  500 mg IntraVENous Q24H    benzonatate (TESSALON) capsule 100 mg  100 mg Oral TID PRN    dexamethasone (DECADRON) 4 mg/mL injection 6 mg  6 mg IntraVENous Q12H    baricitinib (OLUMIANT) tablet 4 mg  4 mg Oral DAILY    zonisamide (ZONEGRAN) capsule 300 mg  300 mg Oral DAILY    levothyroxine (SYNTHROID) tablet 112 mcg  112 mcg Oral 6am    amitriptyline (ELAVIL) tablet 30 mg  30 mg Oral QHS         REVIEW OF SYSTEMS   Negative except as stated in the HPI.       Physical Exam:   Visit Vitals  /66   Pulse 87   Temp 97.3 °F (36.3 °C)   Resp 18   Ht 5' 9\" (1.753 m) Wt 83.9 kg (185 lb)   SpO2 90%   BMI 27.32 kg/m²       General:  Alert, cooperative, no distress, appears stated age. Head:  Normocephalic, without obvious abnormality, atraumatic. Eyes:  Conjunctivae/corneas clear. PERRL, EOMs intact. Nose: Nares normal. Septum midline. Mucosa normal. No drainage or sinus tenderness. Throat: Lips, mucosa, and tongue normal. Teeth and gums normal.   Neck: Supple, symmetrical, trachea midline   Back:   Symmetric, no curvature. ROM normal.   Lungs:   Clear but diminished to auscultation bilaterally. Chest wall:  No tenderness or deformity. Heart:  Regular rate and rhythm, S1, S2 normal, no murmur, click, rub or gallop. Abdomen:   Soft, non-tender. Bowel sounds normal.    Extremities: Extremities normal, atraumatic, no cyanosis or edema. Pulses: 2+ and symmetric all extremities. Skin: Skin color, texture, turgor normal. No rashes or lesions   Lymph nodes: Cervical, supraclavicular, and axillary nodes normal.   Neurologic: Grossly nonfocal       Lab Results   Component Value Date/Time    Sodium 139 12/30/2021 09:18 AM    Potassium 4.0 12/30/2021 09:18 AM    Chloride 106 12/30/2021 09:18 AM    CO2 24 12/30/2021 09:18 AM    BUN 18 12/30/2021 09:18 AM    Creatinine 0.83 12/30/2021 09:18 AM    Glucose 195 (H) 12/30/2021 09:18 AM    Calcium 8.8 12/30/2021 09:18 AM    Magnesium 2.2 12/30/2021 09:18 AM    Phosphorus 3.2 12/26/2021 08:39 AM       Lab Results   Component Value Date/Time    WBC 13.1 (H) 12/30/2021 09:18 AM    HGB 14.9 12/30/2021 09:18 AM    PLATELET 532 (H) 48/54/0367 09:18 AM    MCV 88.9 12/30/2021 09:18 AM       Lab Results   Component Value Date/Time    INR 1.0 12/26/2021 08:39 AM    Alk.  phosphatase 66 12/30/2021 09:18 AM    Protein, total 6.5 12/30/2021 09:18 AM    Albumin 2.7 (L) 12/30/2021 09:18 AM    Globulin 3.8 12/30/2021 09:18 AM       Lab Results   Component Value Date/Time    Ferritin 788 (H) 12/26/2021 08:39 AM       Lab Results   Component Value Date/Time    C-Reactive protein 0.77 (H) 12/30/2021 09:18 AM        No results found for: PH, PHI, PCO2, PCO2I, PO2, PO2I, HCO3, HCO3I, FIO2, FIO2I    No results found for: CPK, RCK1, RCK2, RCK3, RCK4, CKNDX, CKND1, TROPT, TROIQ, BNPP, BNP     Lab Results   Component Value Date/Time    Culture result:  12/26/2021 08:39 AM     ONE OF TWO BOTTLES HAS BEEN FLAGGED POSITIVE BY INSTRUMENT. BOTTLE HAS BEEN SENT TO [facility] TO ASSESS FOR POSSIBLE GROWTH. Culture result: NO GROWTH 4 DAYS 12/26/2021 08:39 AM    Culture result:  04/18/2013 08:20 PM     MRSA PRESENT      Screening of patient nares for MRSA is for surveillance purposes and, if positive, to facilitate isolation considerations in high risk settings. It is not intended for automatic decolonization interventions per se as regimens are not sufficiently effective to warrant routine use. No results found for: TOXA1, RPR, HBCM, HBSAG, HAAB, HCAB1, HAAT, G6PD, CRYAC, HIVGT, HIVR, HIV1, HIV12, HIVPC, HIVRPI    No results found for: VANCT, CPK    Lab Results   Component Value Date/Time    Color YELLOW 04/18/2013 06:45 PM    Appearance CLEAR 04/18/2013 06:45 PM    pH (UA) 7.0 04/18/2013 06:45 PM    Protein 30 (A) 04/18/2013 06:45 PM    Glucose NEGATIVE  04/18/2013 06:45 PM    Ketone TRACE (A) 04/18/2013 06:45 PM    Bilirubin NEGATIVE  04/18/2013 06:45 PM    Blood MODERATE (A) 04/18/2013 06:45 PM    Urobilinogen 0.2 04/18/2013 06:45 PM    Nitrites NEGATIVE  04/18/2013 06:45 PM    Leukocyte Esterase NEGATIVE  04/18/2013 06:45 PM    WBC 0-4 04/18/2013 06:45 PM    RBC 20-50 04/18/2013 06:45 PM    Bacteria NEGATIVE  04/18/2013 06:45 PM       IMPRESSION  · Acute hypoxic respiratory failure, currently on 6LNC o2   · COVID-19 pneumonia, interval worsening on repeat cxr 12/29  · Former smoker     PLAN  Supp for goal spo2 90% or greater. Pt is aware o2 requirement must be lower at rest/with exertion before considering discharge.    Increase dexamethasone to 10mg IV BID, wean with clinical improvement. Continue bariticinib. Continue diuresis as tolerated. Empiric abx per primary team. Procalcitonin is low, suspect this is all due to covid-19. Proph lovenox. Trend d-dimer, inflammatory markers. Encouraged IS use, prone when in bed, OOB/chair/mobilize as able. Discussed case with hospitalist Dr. Cora Gómez. Thank you for the consult. Pulmonary will be available PRN through the extended holiday weekend. Call for questions or concerns.      Jagdish Damon NP

## 2021-12-30 NOTE — PROGRESS NOTES
Spoke at length with pt today about concerns regarding persistent hypoxia. This AM, at the edge of the bed pt's sats barely 90% and with exertion, on 6L, dropped to 80%. Pt got back in bed and sat's hovered in the mid 80 range. Explained that at this point pt is not clinically safe to be discharged. Her oxygen requirements are high and even on 6L she continues to dip. Pulmonary had been consulted and agreed. Pt is alert and oriented. Understands the ramifications of her decision including possibility of death without oxygen. I further asked her why she was adamant to leave today. She stated she was tired of being her and worried about the financials of being here. Explained hospital resources including payment plans, etc but stated I didn't understand.

## 2021-12-30 NOTE — PROGRESS NOTES
12/30/2021   CARE MANAGEMENT NOTE:  CM reviewed EMR for clinical updates. Pt was admitted with COVID. Reportedly, pt resides with her  Roma Gayle ([phone]).    RUR 7%     Transition Plan of Care:  1. Plan is for pt to return home with her   2. PT/OT - pt declining rehab therapies  3. Home oxygen eval will be needed prior to discharge  4. Outpt f/u  5.  will transport pt home     CM will continue to follow pt until discharged.   Ayden

## 2021-12-30 NOTE — PROGRESS NOTES
At [phone] this Travis Vallecillo was notified by woman staff  that no phlebotomist on night shift; no phlebotomist until 0700, per . Unsuccessful attempt x2 R posterior hand peripheral sticks, by this author just now, to obtain bld for morning labs.

## 2021-12-30 NOTE — PROGRESS NOTES
Pt's oxygen flow has been weaned up ad down throughout the shift. Mostly at patients request however sats remain intermittently between 86-90 on 5lpm. Flow increased to 7. Explained to patient why there is a need to increase.  Pt adamantly wants to go home and not happy with increased flow

## 2021-12-31 VITALS
HEIGHT: 69 IN | SYSTOLIC BLOOD PRESSURE: 117 MMHG | TEMPERATURE: 97.2 F | WEIGHT: 185 LBS | DIASTOLIC BLOOD PRESSURE: 65 MMHG | RESPIRATION RATE: 18 BRPM | HEART RATE: 89 BPM | BODY MASS INDEX: 27.4 KG/M2 | OXYGEN SATURATION: 92 %

## 2021-12-31 LAB
ANION GAP SERPL CALC-SCNC: 7 MMOL/L (ref 5–15)
BASOPHILS # BLD: 0 K/UL (ref 0–0.1)
BASOPHILS NFR BLD: 0 % (ref 0–1)
BUN SERPL-MCNC: 25 MG/DL (ref 6–20)
BUN/CREAT SERPL: 33 (ref 12–20)
CALCIUM SERPL-MCNC: 8.9 MG/DL (ref 8.5–10.1)
CHLORIDE SERPL-SCNC: 107 MMOL/L (ref 97–108)
CO2 SERPL-SCNC: 23 MMOL/L (ref 21–32)
CREAT SERPL-MCNC: 0.75 MG/DL (ref 0.55–1.02)
CRP SERPL-MCNC: 0.49 MG/DL (ref 0–0.6)
D DIMER PPP FEU-MCNC: 0.44 MG/L FEU (ref 0–0.65)
DIFFERENTIAL METHOD BLD: ABNORMAL
EOSINOPHIL # BLD: 0 K/UL (ref 0–0.4)
EOSINOPHIL NFR BLD: 0 % (ref 0–7)
ERYTHROCYTE [DISTWIDTH] IN BLOOD BY AUTOMATED COUNT: 12 % (ref 11.5–14.5)
GLUCOSE SERPL-MCNC: 223 MG/DL (ref 65–100)
HCT VFR BLD AUTO: 45 % (ref 35–47)
HGB BLD-MCNC: 15.2 G/DL (ref 11.5–16)
IMM GRANULOCYTES # BLD AUTO: 0 K/UL
IMM GRANULOCYTES NFR BLD AUTO: 0 %
LYMPHOCYTES # BLD: 1.7 K/UL (ref 0.8–3.5)
LYMPHOCYTES NFR BLD: 10 % (ref 12–49)
MCH RBC QN AUTO: 30 PG (ref 26–34)
MCHC RBC AUTO-ENTMCNC: 33.8 G/DL (ref 30–36.5)
MCV RBC AUTO: 88.8 FL (ref 80–99)
MONOCYTES # BLD: 0.3 K/UL (ref 0–1)
MONOCYTES NFR BLD: 2 % (ref 5–13)
MYELOCYTES NFR BLD MANUAL: 2 %
NEUTS SEG # BLD: 14.3 K/UL (ref 1.8–8)
NEUTS SEG NFR BLD: 86 % (ref 32–75)
NRBC # BLD: 0 K/UL (ref 0–0.01)
NRBC BLD-RTO: 0 PER 100 WBC
PLATELET # BLD AUTO: 482 K/UL (ref 150–400)
PMV BLD AUTO: 9.2 FL (ref 8.9–12.9)
POTASSIUM SERPL-SCNC: 4.5 MMOL/L (ref 3.5–5.1)
RBC # BLD AUTO: 5.07 M/UL (ref 3.8–5.2)
RBC MORPH BLD: ABNORMAL
SODIUM SERPL-SCNC: 137 MMOL/L (ref 136–145)
WBC # BLD AUTO: 16.6 K/UL (ref 3.6–11)

## 2021-12-31 PROCEDURE — 80048 BASIC METABOLIC PNL TOTAL CA: CPT

## 2021-12-31 PROCEDURE — 94640 AIRWAY INHALATION TREATMENT: CPT

## 2021-12-31 PROCEDURE — 77010033678 HC OXYGEN DAILY

## 2021-12-31 PROCEDURE — 85025 COMPLETE CBC W/AUTO DIFF WBC: CPT

## 2021-12-31 PROCEDURE — 36415 COLL VENOUS BLD VENIPUNCTURE: CPT

## 2021-12-31 PROCEDURE — 86140 C-REACTIVE PROTEIN: CPT

## 2021-12-31 PROCEDURE — 74011250636 HC RX REV CODE- 250/636: Performed by: INTERNAL MEDICINE

## 2021-12-31 PROCEDURE — 74011250636 HC RX REV CODE- 250/636: Performed by: NURSE PRACTITIONER

## 2021-12-31 PROCEDURE — 74011250637 HC RX REV CODE- 250/637: Performed by: INTERNAL MEDICINE

## 2021-12-31 PROCEDURE — 85379 FIBRIN DEGRADATION QUANT: CPT

## 2021-12-31 RX ORDER — DEXAMETHASONE 6 MG/1
TABLET ORAL
Qty: 11 TABLET | Refills: 0 | Status: ON HOLD | OUTPATIENT
Start: 2021-12-31 | End: 2022-01-23

## 2021-12-31 RX ORDER — ZINC SULFATE 50(220)MG
1 CAPSULE ORAL DAILY
Qty: 7 CAPSULE | Refills: 0 | Status: SHIPPED | OUTPATIENT
Start: 2022-01-01 | End: 2022-01-08

## 2021-12-31 RX ORDER — DOXYCYCLINE 100 MG/1
100 TABLET ORAL 2 TIMES DAILY
Qty: 10 TABLET | Refills: 0 | Status: SHIPPED | OUTPATIENT
Start: 2021-12-31 | End: 2022-01-05

## 2021-12-31 RX ORDER — ASCORBIC ACID 500 MG
500 TABLET ORAL 2 TIMES DAILY
Qty: 14 TABLET | Refills: 0 | Status: SHIPPED | OUTPATIENT
Start: 2021-12-31 | End: 2022-01-07

## 2021-12-31 RX ORDER — DEXAMETHASONE 6 MG/1
6 TABLET ORAL EVERY 12 HOURS
Status: DISCONTINUED | OUTPATIENT
Start: 2021-12-31 | End: 2021-12-31 | Stop reason: HOSPADM

## 2021-12-31 RX ORDER — AMOXICILLIN AND CLAVULANATE POTASSIUM 875; 125 MG/1; MG/1
1 TABLET, FILM COATED ORAL EVERY 12 HOURS
Qty: 10 TABLET | Refills: 0 | Status: SHIPPED | OUTPATIENT
Start: 2021-12-31 | End: 2022-01-05

## 2021-12-31 RX ADMIN — DEXAMETHASONE SODIUM PHOSPHATE 10 MG: 4 INJECTION, SOLUTION INTRAMUSCULAR; INTRAVENOUS at 08:33

## 2021-12-31 RX ADMIN — Medication 1 CAPSULE: at 08:33

## 2021-12-31 RX ADMIN — OXYCODONE HYDROCHLORIDE AND ACETAMINOPHEN 500 MG: 500 TABLET ORAL at 08:32

## 2021-12-31 RX ADMIN — AMOXICILLIN AND CLAVULANATE POTASSIUM 1 TABLET: 875; 125 TABLET, FILM COATED ORAL at 08:32

## 2021-12-31 RX ADMIN — LEVOTHYROXINE SODIUM 112 MCG: 0.11 TABLET ORAL at 05:28

## 2021-12-31 RX ADMIN — Medication 10 ML: at 05:28

## 2021-12-31 RX ADMIN — ENOXAPARIN SODIUM 30 MG: 100 INJECTION SUBCUTANEOUS at 08:33

## 2021-12-31 RX ADMIN — FUROSEMIDE 20 MG: 20 TABLET ORAL at 08:33

## 2021-12-31 RX ADMIN — BARICITINIB 4 MG: 2 TABLET, FILM COATED ORAL at 08:32

## 2021-12-31 RX ADMIN — IPRATROPIUM BROMIDE AND ALBUTEROL 1 PUFF: 20; 100 SPRAY, METERED RESPIRATORY (INHALATION) at 02:22

## 2021-12-31 NOTE — PROGRESS NOTES
Patient insists on going home despite educating patient that it is not ideal to go home with her O2 dropping with exertion on oxygen.

## 2021-12-31 NOTE — PROGRESS NOTES
12/31/2021  Case Management Note    10:48 AM  Oxygen delivered to patient, [facility] placed on AVS, ok for discharge. DARWIN Browne    10:16 AM  [facility] accepted patient, will pull oxygen from the closet and provide to patient. Due to droplet plus precautions RN will need to take into room. DARWIN Browne    9:48 AM  Per MD patient can discharge if we can get her home O2; patient wanted to leave AMA yesterday. Preliminary referral was sent to [facility] by previous CM, I have sent referral with orders and documentation to all local oxygen providers as it is a holiday and this CM is not sure who is open. At the time of this writing I am on hold to see if [facility] can service this patient. Will continue to follow and update.      DARWIN Browne

## 2021-12-31 NOTE — PROGRESS NOTES
Bedside and Verbal shift change report given to Marianne rivera RN (oncoming nurse) by Millie Patten RN (offgoing nurse). Report included the following information SBAR, Kardex, Intake/Output, MAR, Accordion and Recent Results.

## 2021-12-31 NOTE — PROGRESS NOTES
VERBAL_RECORDED_WRITTEN:44521::\"Bedside\"} shift change report given to Nasir Robles (oncoming nurse) by Zbigniew Crane (offgoing nurse). Report included the following information SBAR, Kardex, ED Summary, Procedure Summary, Intake/Output, MAR, Recent Results and Med Rec Status; cardiac rhythm; O2 6LPM nc.

## 2021-12-31 NOTE — PROGRESS NOTES
Reiterated to patient that it is not ideal to go home on 6L of oxygen and patient understood and still insists on going home. Discharge instructions given to patient. Taught patient on how to use portable oxygen tank. PIV removed. Awaiting for patient  to pick her up at this time.

## 2021-12-31 NOTE — DISCHARGE INSTRUCTIONS
HOSPITALIST DISCHARGE INSTRUCTIONS  NAME: Cindi Sanon   :  1960   MRN:  [id number]     Date/Time:  2021 9:31 AM    ADMIT DATE: 2021     DISCHARGE DATE: 2021     ADMITTING DIAGNOSIS:  COVID   Hypoxia     DISCHARGE DIAGNOSIS:  As above     MEDICATIONS:     · It is important that you take the medication exactly as they are prescribed. · Keep your medication in the bottles provided by the pharmacist and keep a list of the medication names, dosages, and times to be taken in your wallet. · Do not take other medications without consulting your doctor. Pain Management: per above medications    What to do at Home    Recommended diet:  Regular Diet    Recommended activity: Activity as tolerated    If you experience any of the following symptoms then please call your primary care physician or return to the emergency room if you cannot get hold of your doctor:  Fever, chills, nausea, vomiting, diarrhea, change in mentation, falling, bleeding, shortness of breath, chest pain     Follow Up:  Dr. Fiona Long MD  you are to call and set up an appointment to see them in 2 weeks. Follow-up Information     Follow up With Specialties Details Why Contact Info    Fiona Long MD Family Medicine   [address] [facility]  [phone]          Information obtained by :  I understand that if any problems occur once I am at home I am to contact my physician. I understand and acknowledge receipt of the instructions indicated above.                                                                                                                                            Physician's or R.N.'s Signature                                                                  Date/Time                                                                                                                                              Patient or Representative Signature Date/Time

## 2021-12-31 NOTE — PROGRESS NOTES
Pulse oximetry assessment  92% at rest on 6LPM  80% while ambulating on 6LPM    Recovers to 92% and greater after one minute of rest and controlled breathing on 6L

## 2021-12-31 NOTE — PROGRESS NOTES
Very lengthy conversation with pt regarding current situation. During prior days with exertion pt desat'ed low 80% and did NOT recover on 6L. Discussed that this is dangerous and 6L is max we can do at home. She is adamant she wants to leave. She wanted to leave AMA. She understands that if she leaves AMA she will NOT receive O2 which she needs. Today with exertion she did drop her sat's to 80% but rebounded back up within a minute sitting back in the chair. Her inflammatory markers have downtrended. She is asymptomatic. She reports she has a pulse ox at home a rescue squad down the street. She is adamant she wants to leave. I do suspect she is improving and her oxygenation to slow to follow. She understands the risks of leaving on 6L and understands that she is to call 911 if sat's worsen. Again if she is not discharged she will leave AMA without oxygen which could leave to death and I do feel that she is clinically improving as she is quickly recovering after exertion on O2. Will ask pulmonary to set up a follow-up visit earlier next week. Sending her home on 6L since she is now able to rapidly recover is more reasonable than her leaving AMA, no receiving O2 and risking cardiac arrest/death.

## 2022-01-01 LAB
BACTERIA SPEC CULT: NORMAL
SERVICE CMNT-IMP: NORMAL

## 2022-01-03 ENCOUNTER — PATIENT OUTREACH (OUTPATIENT)
Dept: CASE MANAGEMENT | Age: 62
End: 2022-01-03

## 2022-01-03 LAB
BACTERIA SPEC CULT: ABNORMAL
BACTERIA SPEC CULT: ABNORMAL
SERVICE CMNT-IMP: ABNORMAL

## 2022-01-03 NOTE — DISCHARGE SUMMARY
Physician Discharge Summary     Patient ID:  Jennifer Sutton  [id number]  30 y.o.  1960    Admit date: 12/26/2021    Discharge date and time: 1/2/2022    Admission Diagnoses: Acute hypoxemic respiratory failure due to COVID-19 ([facility] [U07.1, J96.01]    Discharge Diagnoses/Hospital Course   Ms. Gena Miles is a 63 yo female with PMH of obesity, hypothyroidism, seizure d/o admitted for acute respiratory failure with hypoxia 2/2 COVID. She was unvaccinated. She was started on steroids, antibiotics though procalcitonin remained low so lower suspicion for bacterial PNA, baricitinib, vitamin C/zinc. Her CRP and d-dimer normalized. She clinically felt improved however her oxygenation lagged behind. On 12/30 she threatened to leave AMA but at that time her sat's would drop to the 80's on exertion on 6L and would not recover. The following day the pt was still adamant to leave AMA if not discharged. Her eval for hypoxia was improved compared to 12/30 in that she would desat but rapidly normalize with rest. If pt left AMA she would not receive O2 which is absolutely necessary at this time to sustain life. Pt understood that discharging on 6L is not optimal but her leaving AMA and not receiving O2 leading to near immediate decompensation is also not optimal. Since her inflammatory markers were all improved (and actually normalize), pt was CLINICALLY feeling improved, do feel that oxygenation is lagging behind. She is able to maintain her sat's at rest on 6L and though does desat with activity, immediately normalizes when rests. She has a pulse ox at home, she is aware of monitoring her sats closely.  Will obtain pulm f/u early next week (message sent to scheduling via Epic)     PCP: Marian Mckeon MD     Consults: Pulmonary/Intensive care    Discharge Exam:  Visit Vitals  /65 (BP 1 Location: Right upper arm, BP Patient Position: At rest)   Pulse 89   Temp 97.2 °F (36.2 °C)   Resp 18   Ht 5' 9\" (1.753 m)   Wt 83.9 kg (185 lb)   SpO2 92%   BMI 27.32 kg/m²     Gen:  Well-developed, well-nourished, in no acute distress  HEENT:  Pink conjunctivae, PERRL, hearing intact to voice, moist mucous membranes  Neck:  Supple, without masses, thyroid non-tender  Resp: diffuse crackles   Card:  No murmurs, normal S1, S2 without thrills, bruits or peripheral edema  Abd:  Soft, non-tender, non-distended, normoactive bowel sounds are present  Musc:  No cyanosis or clubbing  Skin:  No rashes or ulcers, skin turgor is good  Neuro:  Cranial nerves 3-12 are grossly intact,  strength is 5/5 bilaterally and dorsi / plantarflexion is 5/5 bilaterally, follows commands appropriately  Psych:  Good insight, oriented to person, place and time, alert    Disposition: home    Patient Instructions:   Discharge Medication List as of 12/31/2021 10:52 AM      START taking these medications    Details   amoxicillin-clavulanate (AUGMENTIN) 875-125 mg per tablet Take 1 Tablet by mouth every twelve (12) hours for 5 days. , Normal, Disp-10 Tablet, R-0      doxycycline (ADOXA) 100 mg tablet Take 1 Tablet by mouth two (2) times a day for 5 days. , Normal, Disp-10 Tablet, R-0      ascorbic acid, vitamin C, (VITAMIN C) 500 mg tablet Take 1 Tablet by mouth two (2) times a day for 7 days. , Normal, Disp-14 Tablet, R-0      ipratropium-albuteroL (COMBIVENT RESPIMAT)  mcg/actuation inhaler Take 1 Puff by inhalation every six (6) hours as needed for Wheezing., Normal, Disp-4 g, R-0      zinc sulfate (ZINCATE) 50 mg zinc (220 mg) capsule Take 1 Capsule by mouth daily for 7 days. , Normal, Disp-7 Capsule, R-0      dexAMETHasone (Decadron) 6 mg tablet Please take 6mg BID x 3 days then 6 mg daily x 3 days then 3mg daily x 3 days then stop, Normal, Disp-11 Tablet, R-0         CONTINUE these medications which have NOT CHANGED    Details   valACYclovir (Valtrex) 500 mg tablet Take 500 mg by mouth daily. , Historical Med      tolterodine (DETROL) 2 mg tablet Take 2 mg by mouth two (2) times a day., Historical Med      amitriptyline (ELAVIL) 10 mg tablet Take 30 mg by mouth nightly., Historical Med      levothyroxine (LevoxyL) 112 mcg tablet Take 112 mcg by mouth Daily (before breakfast). , Historical Med      zonisamide (ZONEGRAN) 100 mg capsule Take 300 mg by mouth daily. , Historical Med           Activity: Activity as tolerated  Diet: Resume previous diet  Wound Care: None needed    Follow-up with Mason Reyes MD   Follow-up Information     Follow up With Specialties Details Why Contact Info    Mason Reyes MD Family Medicine   [address]  [phone]      [facility]   Call as needed--just like an urgent care but they come to you instead of you having to go there.   Mobile urgent care service  [email or web address]  [phone]    [facility]    [address] [facility]  [phone]          Approximate time spent in patient care on day of discharge: 35 minutes     Signed:  Gaby Esquivel MD  1/2/2022  8:03 PM

## 2022-01-03 NOTE — PROGRESS NOTES
Patient contacted regarding COVID-19 diagnosis. Discussed COVID-19 related testing which was available at this time. Test results were positive. Patient informed of results, if available? Aware-POA from test done 21, PTA. Care Transition Nurse contacted the patient by telephone to perform post discharge assessment. Call within 2 business days of discharge: Yes Verified name and  with patient as identifiers. Provided introduction to self, and explanation of the CTN/ACM role, and reason for call due to risk factors for infection and/or exposure to COVID-19. Symptoms reviewed with patient who verbalized the following symptoms: shortness of breath, no new symptoms and no worsening symptoms      Due to no new or worsening symptoms encounter was not routed to provider for escalation. Discussed follow-up appointments. If no appointment was previously scheduled, appointment scheduling offered:  She states she will call PCP to schedule follow up. She reports increased HR- monitored by pulse oximeter- with activity. Resting HR is \"normal\". [facility] follow up appointment(s): No future appointments. Non-[facility] follow up appointment(s):   PCP- Dr. Chloe Gonzalez- she will call to schedule    Interventions to address risk factors: Education of patient/family/caregiver/guardian to support self-management-  and Assessment and support for treatment adherence and medication management-      Advance Care Planning:   Does patient have an Advance Directive: not on file. Educated patient about risk for severe COVID-19 due to risk factors according to CDC guidelines. CTN reviewed discharge instructions, medical action plan and red flag symptoms with the patient who verbalized understanding. Discussed COVID vaccination status: no. Education provided on COVID-19 vaccination as appropriate. Discussed exposure protocols and quarantine with CDC Guidelines.  Patient was given an opportunity to verbalize any questions and concerns and agrees to contact CTN or health care provider for questions related to their healthcare. Reviewed and educated patient on any new and changed medications related to discharge diagnosis     Was patient discharged with a pulse oximeter? Has Pulse oximeter at home. She is using to monitor O2 saturation levels. Has not been using O2 today- sats on RA at rest are: 95-97%. Reviewed when to notify provider or seek emergency care. CTN provided contact information. Plan for follow-up call in 5-7 days based on severity of symptoms and risk factors.

## 2022-01-20 ENCOUNTER — HOSPITAL ENCOUNTER (OUTPATIENT)
Dept: GENERAL RADIOLOGY | Age: 62
Discharge: HOME | End: 2022-01-20
Payer: COMMERCIAL

## 2022-01-20 ENCOUNTER — TRANSCRIBE ORDER (OUTPATIENT)
Dept: GENERAL RADIOLOGY | Age: 62
End: 2022-01-20

## 2022-01-20 DIAGNOSIS — R05.1 ACUTE COUGH: Primary | ICD-10-CM

## 2022-01-20 DIAGNOSIS — R05.1 ACUTE COUGH: ICD-10-CM

## 2022-01-20 PROCEDURE — 71046 X-RAY EXAM CHEST 2 VIEWS: CPT

## 2022-01-23 ENCOUNTER — HOSPITAL ENCOUNTER (INPATIENT)
Age: 62
LOS: 3 days | Discharge: HOME | DRG: 189 | End: 2022-01-26
Attending: STUDENT IN AN ORGANIZED HEALTH CARE EDUCATION/TRAINING PROGRAM | Admitting: INTERNAL MEDICINE
Payer: COMMERCIAL

## 2022-01-23 ENCOUNTER — APPOINTMENT (OUTPATIENT)
Dept: CT IMAGING | Age: 62
DRG: 189 | End: 2022-01-23
Attending: STUDENT IN AN ORGANIZED HEALTH CARE EDUCATION/TRAINING PROGRAM
Payer: COMMERCIAL

## 2022-01-23 ENCOUNTER — APPOINTMENT (OUTPATIENT)
Dept: GENERAL RADIOLOGY | Age: 62
DRG: 189 | End: 2022-01-23
Attending: STUDENT IN AN ORGANIZED HEALTH CARE EDUCATION/TRAINING PROGRAM
Payer: COMMERCIAL

## 2022-01-23 DIAGNOSIS — J96.01 ACUTE HYPOXEMIC RESPIRATORY FAILURE DUE TO COVID-19 (HCC): Primary | ICD-10-CM

## 2022-01-23 DIAGNOSIS — U07.1 ACUTE HYPOXEMIC RESPIRATORY FAILURE DUE TO COVID-19 (HCC): Primary | ICD-10-CM

## 2022-01-23 PROBLEM — R09.02 HYPOXIA: Status: ACTIVE | Noted: 2022-01-23

## 2022-01-23 LAB
ALBUMIN SERPL-MCNC: 2.4 G/DL (ref 3.5–5)
ALBUMIN/GLOB SERPL: 0.4 {RATIO} (ref 1.1–2.2)
ALP SERPL-CCNC: 75 U/L (ref 45–117)
ALT SERPL-CCNC: 17 U/L (ref 12–78)
ANION GAP SERPL CALC-SCNC: 7 MMOL/L (ref 5–15)
AST SERPL-CCNC: 19 U/L (ref 15–37)
ATRIAL RATE: 103 BPM
B PERT DNA SPEC QL NAA+PROBE: NOT DETECTED
BASOPHILS # BLD: 0.1 K/UL (ref 0–0.1)
BASOPHILS NFR BLD: 1 % (ref 0–1)
BILIRUB SERPL-MCNC: 0.5 MG/DL (ref 0.2–1)
BNP SERPL-MCNC: 44 PG/ML
BORDETELLA PARAPERTUSSIS PCR, BORPAR: NOT DETECTED
BUN SERPL-MCNC: 11 MG/DL (ref 6–20)
BUN/CREAT SERPL: 15 (ref 12–20)
C PNEUM DNA SPEC QL NAA+PROBE: NOT DETECTED
CALCIUM SERPL-MCNC: 9.3 MG/DL (ref 8.5–10.1)
CALCULATED P AXIS, ECG09: 59 DEGREES
CALCULATED R AXIS, ECG10: 22 DEGREES
CALCULATED T AXIS, ECG11: 13 DEGREES
CHLORIDE SERPL-SCNC: 102 MMOL/L (ref 97–108)
CO2 SERPL-SCNC: 25 MMOL/L (ref 21–32)
CREAT SERPL-MCNC: 0.73 MG/DL (ref 0.55–1.02)
CRP SERPL-MCNC: 17.1 MG/DL (ref 0–0.6)
DIAGNOSIS, 93000: NORMAL
DIFFERENTIAL METHOD BLD: ABNORMAL
EOSINOPHIL # BLD: 0.5 K/UL (ref 0–0.4)
EOSINOPHIL NFR BLD: 5 % (ref 0–7)
ERYTHROCYTE [DISTWIDTH] IN BLOOD BY AUTOMATED COUNT: 13.4 % (ref 11.5–14.5)
ERYTHROCYTE [SEDIMENTATION RATE] IN BLOOD: 1 MM/HR (ref 0–30)
FLUAV H1 2009 PAND RNA SPEC QL NAA+PROBE: NOT DETECTED
FLUAV H1 RNA SPEC QL NAA+PROBE: NOT DETECTED
FLUAV H3 RNA SPEC QL NAA+PROBE: NOT DETECTED
FLUAV SUBTYP SPEC NAA+PROBE: NOT DETECTED
FLUBV RNA SPEC QL NAA+PROBE: NOT DETECTED
GLOBULIN SER CALC-MCNC: 5.6 G/DL (ref 2–4)
GLUCOSE SERPL-MCNC: 128 MG/DL (ref 65–100)
HADV DNA SPEC QL NAA+PROBE: NOT DETECTED
HCOV 229E RNA SPEC QL NAA+PROBE: NOT DETECTED
HCOV HKU1 RNA SPEC QL NAA+PROBE: NOT DETECTED
HCOV NL63 RNA SPEC QL NAA+PROBE: NOT DETECTED
HCOV OC43 RNA SPEC QL NAA+PROBE: NOT DETECTED
HCT VFR BLD AUTO: 39.3 % (ref 35–47)
HGB BLD-MCNC: 13.3 G/DL (ref 11.5–16)
HMPV RNA SPEC QL NAA+PROBE: NOT DETECTED
HPIV1 RNA SPEC QL NAA+PROBE: NOT DETECTED
HPIV2 RNA SPEC QL NAA+PROBE: NOT DETECTED
HPIV3 RNA SPEC QL NAA+PROBE: NOT DETECTED
HPIV4 RNA SPEC QL NAA+PROBE: NOT DETECTED
IMM GRANULOCYTES # BLD AUTO: 0.1 K/UL (ref 0–0.04)
IMM GRANULOCYTES NFR BLD AUTO: 1 % (ref 0–0.5)
LDH SERPL L TO P-CCNC: 553 U/L (ref 81–246)
LYMPHOCYTES # BLD: 1.2 K/UL (ref 0.8–3.5)
LYMPHOCYTES NFR BLD: 12 % (ref 12–49)
M PNEUMO DNA SPEC QL NAA+PROBE: NOT DETECTED
MAGNESIUM SERPL-MCNC: 2.2 MG/DL (ref 1.6–2.4)
MCH RBC QN AUTO: 30.7 PG (ref 26–34)
MCHC RBC AUTO-ENTMCNC: 33.8 G/DL (ref 30–36.5)
MCV RBC AUTO: 90.8 FL (ref 80–99)
MONOCYTES # BLD: 0.8 K/UL (ref 0–1)
MONOCYTES NFR BLD: 8 % (ref 5–13)
NEUTS SEG # BLD: 7.3 K/UL (ref 1.8–8)
NEUTS SEG NFR BLD: 74 % (ref 32–75)
NRBC # BLD: 0 K/UL (ref 0–0.01)
NRBC BLD-RTO: 0 PER 100 WBC
P-R INTERVAL, ECG05: 122 MS
PLATELET # BLD AUTO: 592 K/UL (ref 150–400)
PMV BLD AUTO: 8.5 FL (ref 8.9–12.9)
POTASSIUM SERPL-SCNC: 3.8 MMOL/L (ref 3.5–5.1)
PROCALCITONIN SERPL-MCNC: 0.06 NG/ML
PROT SERPL-MCNC: 8 G/DL (ref 6.4–8.2)
Q-T INTERVAL, ECG07: 330 MS
QRS DURATION, ECG06: 70 MS
QTC CALCULATION (BEZET), ECG08: 432 MS
RBC # BLD AUTO: 4.33 M/UL (ref 3.8–5.2)
RSV RNA SPEC QL NAA+PROBE: NOT DETECTED
RV+EV RNA SPEC QL NAA+PROBE: NOT DETECTED
SARS-COV-2 PCR, COVPCR: NOT DETECTED
SODIUM SERPL-SCNC: 134 MMOL/L (ref 136–145)
TROPONIN-HIGH SENSITIVITY: 9 NG/L (ref 0–51)
TSH SERPL DL<=0.05 MIU/L-ACNC: 2.39 UIU/ML (ref 0.36–3.74)
VENTRICULAR RATE, ECG03: 103 BPM
WBC # BLD AUTO: 9.9 K/UL (ref 3.6–11)

## 2022-01-23 PROCEDURE — 99285 EMERGENCY DEPT VISIT HI MDM: CPT

## 2022-01-23 PROCEDURE — 84145 PROCALCITONIN (PCT): CPT

## 2022-01-23 PROCEDURE — 83615 LACTATE (LD) (LDH) ENZYME: CPT

## 2022-01-23 PROCEDURE — 74011000250 HC RX REV CODE- 250: Performed by: INTERNAL MEDICINE

## 2022-01-23 PROCEDURE — 85025 COMPLETE CBC W/AUTO DIFF WBC: CPT

## 2022-01-23 PROCEDURE — 85652 RBC SED RATE AUTOMATED: CPT

## 2022-01-23 PROCEDURE — 83735 ASSAY OF MAGNESIUM: CPT

## 2022-01-23 PROCEDURE — 74011250636 HC RX REV CODE- 250/636: Performed by: INTERNAL MEDICINE

## 2022-01-23 PROCEDURE — 84484 ASSAY OF TROPONIN QUANT: CPT

## 2022-01-23 PROCEDURE — 87205 SMEAR GRAM STAIN: CPT

## 2022-01-23 PROCEDURE — 83880 ASSAY OF NATRIURETIC PEPTIDE: CPT

## 2022-01-23 PROCEDURE — 71045 X-RAY EXAM CHEST 1 VIEW: CPT

## 2022-01-23 PROCEDURE — 36415 COLL VENOUS BLD VENIPUNCTURE: CPT

## 2022-01-23 PROCEDURE — 65270000029 HC RM PRIVATE

## 2022-01-23 PROCEDURE — 0202U NFCT DS 22 TRGT SARS-COV-2: CPT

## 2022-01-23 PROCEDURE — 87040 BLOOD CULTURE FOR BACTERIA: CPT

## 2022-01-23 PROCEDURE — 71275 CT ANGIOGRAPHY CHEST: CPT

## 2022-01-23 PROCEDURE — 87070 CULTURE OTHR SPECIMN AEROBIC: CPT

## 2022-01-23 PROCEDURE — 80053 COMPREHEN METABOLIC PANEL: CPT

## 2022-01-23 PROCEDURE — 93005 ELECTROCARDIOGRAM TRACING: CPT

## 2022-01-23 PROCEDURE — 86140 C-REACTIVE PROTEIN: CPT

## 2022-01-23 PROCEDURE — 74011250637 HC RX REV CODE- 250/637: Performed by: INTERNAL MEDICINE

## 2022-01-23 PROCEDURE — 74011000636 HC RX REV CODE- 636: Performed by: RADIOLOGY

## 2022-01-23 PROCEDURE — 84443 ASSAY THYROID STIM HORMONE: CPT

## 2022-01-23 RX ORDER — ONDANSETRON 4 MG/1
4 TABLET, ORALLY DISINTEGRATING ORAL
Status: DISCONTINUED | OUTPATIENT
Start: 2022-01-23 | End: 2022-01-26 | Stop reason: HOSPADM

## 2022-01-23 RX ORDER — GUAIFENESIN 600 MG/1
1200 TABLET, EXTENDED RELEASE ORAL 2 TIMES DAILY
Status: DISCONTINUED | OUTPATIENT
Start: 2022-01-23 | End: 2022-01-26 | Stop reason: HOSPADM

## 2022-01-23 RX ORDER — ONDANSETRON 2 MG/ML
4 INJECTION INTRAMUSCULAR; INTRAVENOUS
Status: DISCONTINUED | OUTPATIENT
Start: 2022-01-23 | End: 2022-01-26 | Stop reason: HOSPADM

## 2022-01-23 RX ORDER — TROSPIUM CHLORIDE 20 MG/1
20 TABLET, FILM COATED ORAL 2 TIMES DAILY
Status: DISCONTINUED | OUTPATIENT
Start: 2022-01-23 | End: 2022-01-26 | Stop reason: HOSPADM

## 2022-01-23 RX ORDER — ENOXAPARIN SODIUM 100 MG/ML
40 INJECTION SUBCUTANEOUS DAILY
Status: DISCONTINUED | OUTPATIENT
Start: 2022-01-24 | End: 2022-01-26 | Stop reason: HOSPADM

## 2022-01-23 RX ORDER — ZONISAMIDE 100 MG/1
300 CAPSULE ORAL DAILY
Status: DISCONTINUED | OUTPATIENT
Start: 2022-01-24 | End: 2022-01-26 | Stop reason: HOSPADM

## 2022-01-23 RX ORDER — VANCOMYCIN 2 GRAM/500 ML IN 0.9 % SODIUM CHLORIDE INTRAVENOUS
2000 ONCE
Status: COMPLETED | OUTPATIENT
Start: 2022-01-23 | End: 2022-01-23

## 2022-01-23 RX ORDER — ACETAMINOPHEN 325 MG/1
650 TABLET ORAL
Status: DISCONTINUED | OUTPATIENT
Start: 2022-01-23 | End: 2022-01-26 | Stop reason: HOSPADM

## 2022-01-23 RX ORDER — SODIUM CHLORIDE 0.9 % (FLUSH) 0.9 %
5-40 SYRINGE (ML) INJECTION AS NEEDED
Status: DISCONTINUED | OUTPATIENT
Start: 2022-01-23 | End: 2022-01-26 | Stop reason: HOSPADM

## 2022-01-23 RX ORDER — AMITRIPTYLINE HYDROCHLORIDE 10 MG/1
30 TABLET, FILM COATED ORAL
Status: DISCONTINUED | OUTPATIENT
Start: 2022-01-23 | End: 2022-01-26 | Stop reason: HOSPADM

## 2022-01-23 RX ORDER — VALACYCLOVIR HYDROCHLORIDE 500 MG/1
500 TABLET, FILM COATED ORAL DAILY
Status: DISCONTINUED | OUTPATIENT
Start: 2022-01-24 | End: 2022-01-26 | Stop reason: HOSPADM

## 2022-01-23 RX ORDER — DEXTROAMPHETAMINE SACCHARATE, AMPHETAMINE ASPARTATE, DEXTROAMPHETAMINE SULFATE AND AMPHETAMINE SULFATE 2.5; 2.5; 2.5; 2.5 MG/1; MG/1; MG/1; MG/1
10 TABLET ORAL
COMMUNITY

## 2022-01-23 RX ORDER — LEVOTHYROXINE SODIUM 112 UG/1
112 TABLET ORAL
Status: DISCONTINUED | OUTPATIENT
Start: 2022-01-24 | End: 2022-01-26 | Stop reason: HOSPADM

## 2022-01-23 RX ORDER — LEVOFLOXACIN 500 MG/1
500 TABLET, FILM COATED ORAL DAILY
COMMUNITY
Start: 2022-01-19 | End: 2022-01-26

## 2022-01-23 RX ORDER — ACETAMINOPHEN 650 MG/1
650 SUPPOSITORY RECTAL
Status: DISCONTINUED | OUTPATIENT
Start: 2022-01-23 | End: 2022-01-26 | Stop reason: HOSPADM

## 2022-01-23 RX ORDER — SODIUM CHLORIDE 0.9 % (FLUSH) 0.9 %
5-40 SYRINGE (ML) INJECTION EVERY 8 HOURS
Status: DISCONTINUED | OUTPATIENT
Start: 2022-01-23 | End: 2022-01-26 | Stop reason: HOSPADM

## 2022-01-23 RX ORDER — POLYETHYLENE GLYCOL 3350 17 G/17G
17 POWDER, FOR SOLUTION ORAL DAILY PRN
Status: DISCONTINUED | OUTPATIENT
Start: 2022-01-23 | End: 2022-01-26 | Stop reason: HOSPADM

## 2022-01-23 RX ADMIN — AMITRIPTYLINE HYDROCHLORIDE 30 MG: 10 TABLET, FILM COATED ORAL at 22:07

## 2022-01-23 RX ADMIN — WATER 2 G: 1 INJECTION INTRAMUSCULAR; INTRAVENOUS; SUBCUTANEOUS at 22:07

## 2022-01-23 RX ADMIN — GUAIFENESIN 1200 MG: 600 TABLET, EXTENDED RELEASE ORAL at 18:13

## 2022-01-23 RX ADMIN — IOPAMIDOL 65 ML: 755 INJECTION, SOLUTION INTRAVENOUS at 11:51

## 2022-01-23 RX ADMIN — SODIUM CHLORIDE, PRESERVATIVE FREE 10 ML: 5 INJECTION INTRAVENOUS at 22:08

## 2022-01-23 RX ADMIN — AZITHROMYCIN MONOHYDRATE 500 MG: 500 INJECTION, POWDER, LYOPHILIZED, FOR SOLUTION INTRAVENOUS at 15:38

## 2022-01-23 RX ADMIN — VANCOMYCIN HYDROCHLORIDE 2000 MG: 10 INJECTION, POWDER, LYOPHILIZED, FOR SOLUTION INTRAVENOUS at 15:33

## 2022-01-23 RX ADMIN — TROSPIUM CHLORIDE 20 MG: 20 TABLET, FILM COATED ORAL at 18:13

## 2022-01-23 RX ADMIN — SODIUM CHLORIDE, PRESERVATIVE FREE 10 ML: 5 INJECTION INTRAVENOUS at 15:47

## 2022-01-23 RX ADMIN — WATER 2 G: 1 INJECTION INTRAMUSCULAR; INTRAVENOUS; SUBCUTANEOUS at 15:38

## 2022-01-23 NOTE — H&P
Ton Mckeon jordy [place] [address]  [address]  [phone]    Admission History and Physical      NAME:  Coy Lange   :   1960   MRN:  [id number]     PCP:  Mason Reyes MD     Date/Time of service:  2022  1:47 PM        Subjective:     CHIEF COMPLAINT: Dyspnea    HISTORY OF PRESENT ILLNESS:     Ms. Conrado Landin is a 62 y.o.  female with a past medical history of seizures, hypothyroidism, COVID O2 use on discharge who is admitted with hypoxia. Ms. Conrado Landin was admitted to [facility] last month in December for [facility]. Patient was insisted on leaving and was discharged on 6 L of oxygen. She states that she only use the oxygen for a few days at home and then sent it back. Since then, she states that she had no issues up until about a week ago at which time she began to experience dyspnea. She denies any associated fevers, chills, chest pain, LE edema or syncope. She does endorse some occasional palpitations and cough. She denies any recent sick contacts or travel. No Known Allergies    Prior to Admission medications    Medication Sig Start Date End Date Taking? Authorizing Provider   ipratropium-albuteroL (COMBIVENT RESPIMAT)  mcg/actuation inhaler Take 1 Puff by inhalation every six (6) hours as needed for Wheezing. 21   Ely Ibrara MD   dexAMETHasone (Decadron) 6 mg tablet Please take 6mg BID x 3 days then 6 mg daily x 3 days then 3mg daily x 3 days then stop 21   Ha Rivas MD   valACYclovir (Valtrex) 500 mg tablet Take 500 mg by mouth daily. Provider, Historical   tolterodine (DETROL) 2 mg tablet Take 2 mg by mouth two (2) times a day. Provider, Historical   amitriptyline (ELAVIL) 10 mg tablet Take 30 mg by mouth nightly. Provider, Historical   levothyroxine (LevoxyL) 112 mcg tablet Take 112 mcg by mouth Daily (before breakfast).     Provider, Historical   zonisamide (ZONEGRAN) 100 mg capsule Take 300 mg by mouth daily. Provider, Historical       Past Medical History:   Diagnosis Date    Autoimmune disease ([address]     hypothroidism    Neurological disorder     seizures        Past Surgical History:   Procedure Laterality Date    HX ORTHOPAEDIC  1996    ruptureed disc       Social History     Tobacco Use    Smoking status: Former Smoker    Smokeless tobacco: Current User   Substance Use Topics    Alcohol use: Yes     Comment: rarely        No family history on file. Review of Systems:  Per HPI    Gen:  Weight gain, weight loss, fever, chills, fatigue  Eyes:  Visual changes, pain,   ENT:  rhinorrhea, sore throat   CVS:  Palpitations, chest pain, dizziness, syncope, edema, orthopnea  Pulm:  Cough, dyspnea, sputum, hemoptysis, wheezing  GI:  Abdominal pain, nausea, emesis, diarrhea, constipation, GERD, melena  :  Hematuria, incontinence, dysuria  MS:  Pain, weakness, swelling, arthritis  Skin:  Rash, erythema, wound  Psych: Insomnia, depression, anxiety, suicidal ideation  Endo:  Heat intolerance, cold intolerance, polyuria  Hem:  Enlarged nodes, bruising, bleeding, night sweats  Renal:   change in urine, flank pain  Neuro:  Numbness, tingling, tremors          Objective:      VITALS:    Vital signs reviewed; most recent are:    Visit Vitals  /88 (BP 1 Location: Left upper arm, BP Patient Position: Sitting)   Pulse 99   Temp 98.7 °F (37.1 °C)   Resp 28   Ht 5' 9\" (1.753 m)   Wt 83.9 kg (185 lb)   SpO2 94%   BMI 27.32 kg/m²     SpO2 Readings from Last 6 Encounters:   01/23/22 94%   12/31/21 92%   05/19/14 98%   04/24/13 98%   12/19/12 98%    O2 Flow Rate (L/min): 4 l/min   No intake or output data in the 24 hours ending 01/23/22 1347     Exam:     Physical Exam:    Gen:  Well-developed, well-nourished, in no acute distress  HEENT:  Pink conjunctivae, PERRL, hearing intact to voice  Resp:  No accessory muscle use, clear breath sounds without wheezes rales or rhonchi  Card:  Tachycardia, No murmurs, normal S1, S2, no peripheral edema  Abd:  Soft, non-tender, non-distended, normoactive bowel sounds are present  Musc:  No cyanosis or clubbing  Skin:  No rashes or ulcers, skin turgor is good  Neuro:  Cranial nerves 3-12 are grossly intact,  follows commands appropriately  Psych:  Oriented to person, place, and time, Alert with good insight      Labs:    Recent Labs     01/23/22  1012   WBC 9.9   HGB 13.3   HCT 39.3   *     Recent Labs     01/23/22  1012   *   K 3.8      CO2 25   *   BUN 11   CREA 0.73   CA 9.3   MG 2.2   ALB 2.4*   TBILI 0.5   ALT 17     Lab Results   Component Value Date/Time    Glucose (POC) 116 (H) 04/24/2013 12:03 PM    Glucose (POC) 159 (H) 04/24/2013 05:44 AM     No results for input(s): PH, PCO2, PO2, HCO3, FIO2 in the last 72 hours. No results for input(s): INR, INREXT in the last 72 hours. Radiology and EKG reviewed: CTA chest with no PE, multiple patchy bilateral groundglass and reticular lung opacities concerning for infectious process     Old Records reviewed in [facility]       Assessment/Plan:        Acute respiratory failure with Hypoxia (1/23/2022)/dyspnea: Hypoxic to 88% on room air on arrival; currently on 3L  nasal cannula. Unclear etiology but concern for infectious etiology based on imaging. CTA negative for PE. Blood cultures obtained. Obtain pneumonia serologies. Obtain MRSA. Broad-spectrum antibiotics including IV vancomycin, cefepime and azithromycin. Incentive spirometry. Probnp ok; check echo. Duonebs PRN. Consult pulmonary. Heart palpitations: Check TSH. Monitor on telemetry for any arrhythmias. Seizure disorder ([facility] [address] (4/18/2013): continue home Zonegran      Hypothyroidism (4/18/2013) :  check TSH. Continuelevothyroxine    Urinary retention: continue home detrol. Depression: continue home elavil.      Risk of deterioration: high      Total time spent with patient: 48 Minutes **I personally saw and examined the patient during this time [facility] Plan discussed with: Patient and Nursing Staff    Discussed:  Code Status and Care Plan    Prophylaxis:  Lovenox    Probable Disposition:  Home w/Family           ___________________________________________________    Attending Physician: Kaleb Melgoza DO

## 2022-01-23 NOTE — ED TRIAGE NOTES
Pt to ED for hypoxia, worsening SOB, palpitations after being dx with pneumonia Tuesday. Currently taking levofloxacin. Pt admitted with covid in December. Pt reports sats at home 88%.  Pt hypoxic in triage and placed on oxygen 2L nc

## 2022-01-23 NOTE — ED PROVIDER NOTES
Cindi Sanon is a 62 y.o. female with past medical history notable for hypoxemic respiratory failure admitted to this hospital last month, was discharged and states that she felt much better, had been continuing to improve at home until about 3 days ago. She states that she was off of any oxygen and was able to walk her dogs. She states that over the past 3 days she has gradually had worsening dyspnea. She denies associated fevers, chills. She has a infrequent nonproductive cough. She denies leg swelling, orthopnea. She has not had unintentional weight gain or loss. She denies chest pain. She has had breast dyspnea over the past 24 hours. Past Medical History:   Diagnosis Date    Autoimmune disease ([facility] [address]     hypothroidism    Neurological disorder     seizures       Past Surgical History:   Procedure Laterality Date    HX ORTHOPAEDIC  1996    ruptureed disc         No family history on file. Social History     Socioeconomic History    Marital status: UNKNOWN     Spouse name: Not on file    Number of children: Not on file    Years of education: Not on file    Highest education level: Not on file   Occupational History    Not on file   Tobacco Use    Smoking status: Former Smoker    Smokeless tobacco: Current User   Substance and Sexual Activity    Alcohol use: Yes     Comment: rarely    Drug use: No    Sexual activity: Not on file   Other Topics Concern    Not on file   Social History Narrative    Not on file     Social Determinants of Health     Financial Resource Strain:     Difficulty of Paying Living Expenses: Not on file   Food Insecurity:     Worried About Running Out of Food in the Last Year: Not on file    Migel of Food in the Last Year: Not on file   Transportation Needs:     Lack of Transportation (Medical): Not on file    Lack of Transportation (Non-Medical):  Not on file   Physical Activity:     Days of Exercise per Week: Not on file    Minutes of Exercise per Session: Not on file   Stress:     Feeling of Stress : Not on file   Social Connections:     Frequency of Communication with Friends and Family: Not on file    Frequency of Social Gatherings with Friends and Family: Not on file    Attends Christianity Services: Not on file    Active Member of Clubs or Organizations: Not on file    Attends Club or Organization Meetings: Not on file    Marital Status: Not on file   Intimate Partner Violence:     Fear of Current or Ex-Partner: Not on file    Emotionally Abused: Not on file    Physically Abused: Not on file    Sexually Abused: Not on file   Housing Stability:     Unable to Pay for Housing in the Last Year: Not on file    Number of [place] in the Last Year: Not on file    Unstable Housing in the Last Year: Not on file         ALLERGIES: Patient has no known allergies. Review of Systems   Constitutional: Positive for fatigue. Negative for chills and fever. Eyes: Negative for photophobia. Respiratory: Positive for cough and shortness of breath. Cardiovascular: Negative for chest pain and leg swelling. Gastrointestinal: Negative for abdominal pain and nausea. Genitourinary: Negative for dysuria. Musculoskeletal: Negative for back pain. Neurological: Negative for headaches. Psychiatric/Behavioral: Negative for confusion. All other systems reviewed and are negative. Vitals:    01/23/22 0939 01/23/22 0945 01/23/22 1000 01/23/22 1110   BP:  102/77 107/71    Pulse:   (!) 101    Resp:   30    Temp:       SpO2: 94%  95% 96%   Weight:       Height:                Physical Exam  Vitals and nursing note reviewed. Constitutional:       General: She is not in acute distress. Appearance: She is well-developed. She is not toxic-appearing. HENT:      Head: Normocephalic and atraumatic. Mouth/Throat:      Mouth: Mucous membranes are moist.      Pharynx: No oropharyngeal exudate.    Eyes:      Extraocular Movements: Extraocular movements intact. Pupils: Pupils are equal, round, and reactive to light. Cardiovascular:      Rate and Rhythm: Normal rate and regular rhythm. Heart sounds: Normal heart sounds. Pulmonary:      Effort: Pulmonary effort is normal. Tachypnea present. No respiratory distress. Chest:      Chest wall: No tenderness. Abdominal:      General: There is no distension. Palpations: Abdomen is soft. Tenderness: There is no abdominal tenderness. There is no right CVA tenderness or left CVA tenderness. Musculoskeletal:         General: No deformity. Cervical back: Normal range of motion. Right lower leg: No edema. Left lower leg: No edema. Comments: Entire spine palpated, no tenderness or deformity. Skin:     General: Skin is warm and dry. Capillary Refill: Capillary refill takes less than 2 seconds. Neurological:      General: No focal deficit present. Mental Status: She is alert and oriented to person, place, and time. Psychiatric:         Mood and Affect: Mood normal.          [facility]     MEDICAL DECISION MAKIN y.o. female presents with Cough, Palpitations, and Shortness of Breath    Differential diagnosis includes but not limited to: COVID-related lung disease, superinfection, heart failure, pleural effusions amongst other possibilities. Her x-ray appears to be worsened from previous but still shows similar appearance and location of multifocal opacities. She is afebrile. Her symptoms primarily are of hypoxia at this point. She does not have bronchospasm on my evaluation. Given that she improved significantly and then worsened will rule out PE.       LABORATORY TESTS:  Labs Reviewed   CBC WITH AUTOMATED DIFF - Abnormal; Notable for the following components:       Result Value    PLATELET 535 (*)     MPV 8.5 (*)     IMMATURE GRANULOCYTES 1 (*)     ABS. EOSINOPHILS 0.5 (*)     ABS. IMM.  GRANS. 0.1 (*)     All other components within normal limits METABOLIC PANEL, COMPREHENSIVE - Abnormal; Notable for the following components:    Sodium 134 (*)     Glucose 128 (*)     Albumin 2.4 (*)     Globulin 5.6 (*)     A-G Ratio 0.4 (*)     All other components within normal limits   CULTURE, BLOOD   CULTURE, BLOOD   MAGNESIUM   PROCALCITONIN   NT-PRO BNP   TROPONIN-HIGH SENSITIVITY   D DIMER   SAMPLES BEING HELD   PROTHROMBIN TIME + INR       IMAGING RESULTS:  XR CHEST PORT   Final Result   Multifocal bilateral airspace disease, similar to the prior study. CTA CHEST W OR W WO CONT    (Results Pending)       MEDICATIONS GIVEN:  Medications   iopamidoL (ISOVUE-370) 76 % injection 100 mL (has no administration in time range)       PROGRESS NOTE:   11:13 AM Patient has remained stable     EKG:   reviewed    CONSULTS:  Hospitalist Consult: [address] for Admission  11:14 AM    ED Room Number: ER19/19  Patient Name and age: Hakeem Daniels 62 y.o.  female  Working Diagnosis:   1. Acute hypoxemic respiratory failure due to COVID-19 [facility])        COVID-19 Suspicion:  yes  Sepsis present:  no  Reassessment needed: no  Code Status:  Full Code  Readmission: no  Isolation Requirements:  yes  Recommended Level of Care:  med/surg  Department:[facility] ED - [phone]  Other: Patient was admitted last month and discharged on oxygen, had improved after discharge so that she didn't need o2 3 days after discharge. Over the past several days however she has had gradually worsening shortness of breath and was hypoxic on room air. CTA ordred    IMPRESSION:  1. Acute hypoxemic respiratory failure due to COVID-19 [facility])        PLAN:  - Admit to hospitalist    Total critical care time spent exclusive of procedures:  35 minutes    Leilani Brooks MD          Please note that this dictation was completed with [employer], the [employer] voice recognition software.   Quite often unanticipated grammatical, syntax, homophones, and other interpretive errors are inadvertently transcribed by the computer software. Please disregard these errors. Please excuse any errors that have escaped final proofreading.   Procedures

## 2022-01-23 NOTE — PROGRESS NOTES
Admission Medication Reconciliation:     Information obtained from:    Patient via interview in [address]:  YES    Comments/Recommendations:   Patient able to confirm name, , allergies, and preferred pharmacy  Updated PTA medication list       ¹RxQuery pharmacy benefit data reflects medications filled and processed through the patient's insurance, however   this data does NOT capture whether the medication was picked up or is currently being taken by the patient. Prior to Admission Medications   Prescriptions Last Dose Informant Taking?   amitriptyline (ELAVIL) 10 mg tablet 2022 at Unknown time Other Yes   Sig: Take 30 mg by mouth nightly. dextroamphetamine-amphetamine (AdderalL) 10 mg tablet  Other Yes   Sig: Take 10 mg by mouth two (2) times daily as needed (attention). levoFLOXacin (Levaquin) 500 mg tablet 2022 at Unknown time Other Yes   Sig: Take 500 mg by mouth daily. levothyroxine (LevoxyL) 112 mcg tablet 2022 at Unknown time Other Yes   Sig: Take 112 mcg by mouth Daily (before breakfast). tolterodine (DETROL) 2 mg tablet 2022 at Unknown time Other Yes   Sig: Take 2 mg by mouth two (2) times a day. valACYclovir (Valtrex) 500 mg tablet 2022 at Unknown time Other Yes   Sig: Take 500 mg by mouth daily. zonisamide (ZONEGRAN) 100 mg capsule 2022 at Unknown time Other Yes   Sig: Take 300 mg by mouth daily. Facility-Administered Medications: None         Please contact the main inpatient pharmacy with any questions or concerns at [phone] and we will direct you to the clinical pharmacist covering this patient's care while in-house.    Sabino Clayton, PharmD, BCPS

## 2022-01-23 NOTE — PROGRESS NOTES
[facility] Progress Note: Antimicrobial Stewardship    Consult for antibiotic dosing of vancomycin by Dr. Lisa Otoole  Indication: Acute respiratory failure with Hypoxia (recent hospital stay)  Day of Therapy: 1    Plan:  Vancomycin therapy:   Start with loading dose of vancomycin 2000 mg IV (25 mg/kg, max 2.5 gm)   Follow with maintenance dose of vancomycin 1000 mg IV every 12 hours    Dose calculated to approximate a   Target AUC/PHIL of 400  Trough of n/a mcg/mL. Plan: For a level 24 hours post the first maintenance dose. Pharmacy to follow daily and will make changes to dose and/or frequency based on clinical status. Date Dose & Interval Measured (mcg/mL) Extrapolated (mcg/mL)   ? ? ? ?   ? ? ? ?   ? ? ? ? Other Antimicrobial  (not dosed by pharmacist)   Azithromycin, cefepime   Cultures     1/23 Blood x 2   Serum Creatinine     Lab Results   Component Value Date/Time    Creatinine 0.73 01/23/2022 10:12 AM       Creatinine Clearance Estimated Creatinine Clearance: 94.8 mL/min (based on SCr of 0.73 mg/dL). Procalcitonin    Lab Results   Component Value Date/Time    Procalcitonin 0.06 01/23/2022 10:12 AM        Temp   98.7 °F (37.1 °C)    WBC   Lab Results   Component Value Date/Time    WBC 9.9 01/23/2022 10:12 AM       For Antifungals, Metronidazole and Nafcillin: Lab Results   Component Value Date/Time    ALT (SGPT) 17 01/23/2022 10:12 AM    AST (SGOT) 19 01/23/2022 10:12 AM    Alk.  phosphatase 75 01/23/2022 10:12 AM    Bilirubin, total 0.5 01/23/2022 10:12 AM         Pharmacist: [facility]

## 2022-01-24 ENCOUNTER — APPOINTMENT (OUTPATIENT)
Dept: NON INVASIVE DIAGNOSTICS | Age: 62
DRG: 189 | End: 2022-01-24
Attending: INTERNAL MEDICINE
Payer: COMMERCIAL

## 2022-01-24 LAB
ALBUMIN SERPL-MCNC: 2.1 G/DL (ref 3.5–5)
ALBUMIN/GLOB SERPL: 0.4 {RATIO} (ref 1.1–2.2)
ALP SERPL-CCNC: 62 U/L (ref 45–117)
ALT SERPL-CCNC: 15 U/L (ref 12–78)
ANION GAP SERPL CALC-SCNC: 6 MMOL/L (ref 5–15)
AST SERPL-CCNC: 18 U/L (ref 15–37)
BASOPHILS # BLD: 0.1 K/UL (ref 0–0.1)
BASOPHILS NFR BLD: 1 % (ref 0–1)
BILIRUB SERPL-MCNC: 0.5 MG/DL (ref 0.2–1)
BUN SERPL-MCNC: 10 MG/DL (ref 6–20)
BUN/CREAT SERPL: 15 (ref 12–20)
CALCIUM SERPL-MCNC: 9 MG/DL (ref 8.5–10.1)
CHLORIDE SERPL-SCNC: 101 MMOL/L (ref 97–108)
CO2 SERPL-SCNC: 26 MMOL/L (ref 21–32)
COMMENT, HOLDF: NORMAL
CREAT SERPL-MCNC: 0.65 MG/DL (ref 0.55–1.02)
CRP SERPL-MCNC: 10.6 MG/DL (ref 0–0.6)
DIFFERENTIAL METHOD BLD: ABNORMAL
ECHO AO ASC DIAM: 3.5 CM
ECHO AO ASCENDING AORTA INDEX: 1.75 CM/M2
ECHO AV AREA PEAK VELOCITY: 2.2 CM2
ECHO AV AREA PEAK VELOCITY: 2.2 CM2
ECHO AV AREA VTI: 2.6 CM2
ECHO AV AREA VTI: 2.6 CM2
ECHO AV MEAN GRADIENT: 4 MMHG
ECHO AV MEAN VELOCITY: 0.9 M/S
ECHO AV PEAK GRADIENT: 8 MMHG
ECHO AV PEAK VELOCITY: 1.4 M/S
ECHO AV VELOCITY RATIO: 0.79
ECHO AV VTI: 21.2 CM
ECHO LA DIAMETER INDEX: 1.85 CM/M2
ECHO LA DIAMETER: 3.7 CM
ECHO LA VOL 2C: 39 ML (ref 22–52)
ECHO LA VOL 4C: 33 ML (ref 22–52)
ECHO LA VOL BP: 38 ML (ref 22–52)
ECHO LA VOL BP: 38 ML (ref 22–52)
ECHO LA VOLUME AREA LENGTH: 41 ML
ECHO LA VOLUME INDEX A2C: 20 ML/M2 (ref 16–34)
ECHO LA VOLUME INDEX A4C: 17 ML/M2 (ref 16–34)
ECHO LA VOLUME INDEX AREA LENGTH: 21 ML/M2 (ref 16–34)
ECHO LV E' LATERAL VELOCITY: 11 CM/S
ECHO LV E' SEPTAL VELOCITY: 8 CM/S
ECHO LV FRACTIONAL SHORTENING: 23 % (ref 28–44)
ECHO LV INTERNAL DIMENSION DIASTOLE INDEX: 2.2 CM/M2
ECHO LV INTERNAL DIMENSION DIASTOLIC: 4.4 CM (ref 3.9–5.3)
ECHO LV INTERNAL DIMENSION SYSTOLIC INDEX: 1.7 CM/M2
ECHO LV INTERNAL DIMENSION SYSTOLIC: 3.4 CM
ECHO LV IVSD: 1.1 CM (ref 0.6–0.9)
ECHO LV MASS 2D: 180 G (ref 67–162)
ECHO LV MASS INDEX 2D: 90 G/M2 (ref 43–95)
ECHO LV POSTERIOR WALL DIASTOLIC: 1.2 CM (ref 0.6–0.9)
ECHO LV RELATIVE WALL THICKNESS RATIO: 0.55
ECHO LVOT AREA: 3.1 CM2
ECHO LVOT AV VTI INDEX: 0.88
ECHO LVOT DIAM: 2 CM
ECHO LVOT MEAN GRADIENT: 2 MMHG
ECHO LVOT PEAK GRADIENT: 5 MMHG
ECHO LVOT PEAK VELOCITY: 1.1 M/S
ECHO LVOT STROKE VOLUME INDEX: 29.2 ML/M2
ECHO LVOT SV: 58.4 ML
ECHO LVOT VTI: 18.6 CM
ECHO MV A VELOCITY: 0.67 M/S
ECHO MV E DECELERATION TIME (DT): 159 MS
ECHO MV E VELOCITY: 0.48 M/S
ECHO MV E/A RATIO: 0.72
ECHO MV E/E' LATERAL: 4.36
ECHO MV E/E' RATIO (AVERAGED): 5.18
ECHO MV E/E' SEPTAL: 6
ECHO PV MAX VELOCITY: 1 M/S
ECHO PV PEAK GRADIENT: 4 MMHG
ECHO RV INTERNAL DIMENSION: 4.2 CM
ECHO RV TAPSE: 1.8 CM (ref 1.5–2)
ECHO RVOT PEAK GRADIENT: 2 MMHG
ECHO RVOT PEAK VELOCITY: 0.7 M/S
ECHO TV REGURGITANT MAX VELOCITY: 2.66 M/S
ECHO TV REGURGITANT PEAK GRADIENT: 28 MMHG
EOSINOPHIL # BLD: 0.4 K/UL (ref 0–0.4)
EOSINOPHIL NFR BLD: 5 % (ref 0–7)
ERYTHROCYTE [DISTWIDTH] IN BLOOD BY AUTOMATED COUNT: 13.4 % (ref 11.5–14.5)
GLOBULIN SER CALC-MCNC: 4.8 G/DL (ref 2–4)
GLUCOSE SERPL-MCNC: 122 MG/DL (ref 65–100)
HCT VFR BLD AUTO: 34.9 % (ref 35–47)
HGB BLD-MCNC: 11.6 G/DL (ref 11.5–16)
IMM GRANULOCYTES # BLD AUTO: 0.1 K/UL (ref 0–0.04)
IMM GRANULOCYTES NFR BLD AUTO: 1 % (ref 0–0.5)
LYMPHOCYTES # BLD: 1.1 K/UL (ref 0.8–3.5)
LYMPHOCYTES NFR BLD: 13 % (ref 12–49)
MAGNESIUM SERPL-MCNC: 2.3 MG/DL (ref 1.6–2.4)
MCH RBC QN AUTO: 29.6 PG (ref 26–34)
MCHC RBC AUTO-ENTMCNC: 33.2 G/DL (ref 30–36.5)
MCV RBC AUTO: 89 FL (ref 80–99)
MONOCYTES # BLD: 0.9 K/UL (ref 0–1)
MONOCYTES NFR BLD: 10 % (ref 5–13)
NEUTS SEG # BLD: 6.4 K/UL (ref 1.8–8)
NEUTS SEG NFR BLD: 70 % (ref 32–75)
NRBC # BLD: 0 K/UL (ref 0–0.01)
NRBC BLD-RTO: 0 PER 100 WBC
PLATELET # BLD AUTO: 528 K/UL (ref 150–400)
PMV BLD AUTO: 8.4 FL (ref 8.9–12.9)
POTASSIUM SERPL-SCNC: 4 MMOL/L (ref 3.5–5.1)
PROT SERPL-MCNC: 6.9 G/DL (ref 6.4–8.2)
RBC # BLD AUTO: 3.92 M/UL (ref 3.8–5.2)
SAMPLES BEING HELD,HOLD: NORMAL
SODIUM SERPL-SCNC: 133 MMOL/L (ref 136–145)
WBC # BLD AUTO: 9 K/UL (ref 3.6–11)

## 2022-01-24 PROCEDURE — 85025 COMPLETE CBC W/AUTO DIFF WBC: CPT

## 2022-01-24 PROCEDURE — 74011000250 HC RX REV CODE- 250: Performed by: INTERNAL MEDICINE

## 2022-01-24 PROCEDURE — 93306 TTE W/DOPPLER COMPLETE: CPT

## 2022-01-24 PROCEDURE — 65270000029 HC RM PRIVATE

## 2022-01-24 PROCEDURE — 93306 TTE W/DOPPLER COMPLETE: CPT | Performed by: STUDENT IN AN ORGANIZED HEALTH CARE EDUCATION/TRAINING PROGRAM

## 2022-01-24 PROCEDURE — 74011250637 HC RX REV CODE- 250/637: Performed by: INTERNAL MEDICINE

## 2022-01-24 PROCEDURE — 36415 COLL VENOUS BLD VENIPUNCTURE: CPT

## 2022-01-24 PROCEDURE — 77010033678 HC OXYGEN DAILY

## 2022-01-24 PROCEDURE — 74011250636 HC RX REV CODE- 250/636: Performed by: INTERNAL MEDICINE

## 2022-01-24 PROCEDURE — 80053 COMPREHEN METABOLIC PANEL: CPT

## 2022-01-24 PROCEDURE — 94761 N-INVAS EAR/PLS OXIMETRY MLT: CPT

## 2022-01-24 PROCEDURE — 86140 C-REACTIVE PROTEIN: CPT

## 2022-01-24 PROCEDURE — 83735 ASSAY OF MAGNESIUM: CPT

## 2022-01-24 RX ADMIN — TROSPIUM CHLORIDE 20 MG: 20 TABLET, FILM COATED ORAL at 16:58

## 2022-01-24 RX ADMIN — AZITHROMYCIN MONOHYDRATE 500 MG: 500 INJECTION, POWDER, LYOPHILIZED, FOR SOLUTION INTRAVENOUS at 16:55

## 2022-01-24 RX ADMIN — TROSPIUM CHLORIDE 20 MG: 20 TABLET, FILM COATED ORAL at 08:44

## 2022-01-24 RX ADMIN — ENOXAPARIN SODIUM 40 MG: 100 INJECTION SUBCUTANEOUS at 08:44

## 2022-01-24 RX ADMIN — AMITRIPTYLINE HYDROCHLORIDE 30 MG: 10 TABLET, FILM COATED ORAL at 23:30

## 2022-01-24 RX ADMIN — VALACYCLOVIR HYDROCHLORIDE 500 MG: 500 TABLET, FILM COATED ORAL at 08:44

## 2022-01-24 RX ADMIN — SODIUM CHLORIDE, PRESERVATIVE FREE 10 ML: 5 INJECTION INTRAVENOUS at 17:19

## 2022-01-24 RX ADMIN — GUAIFENESIN 1200 MG: 600 TABLET, EXTENDED RELEASE ORAL at 16:58

## 2022-01-24 RX ADMIN — SODIUM CHLORIDE, PRESERVATIVE FREE 10 ML: 5 INJECTION INTRAVENOUS at 05:05

## 2022-01-24 RX ADMIN — WATER 2 G: 1 INJECTION INTRAMUSCULAR; INTRAVENOUS; SUBCUTANEOUS at 16:58

## 2022-01-24 RX ADMIN — WATER 2 G: 1 INJECTION INTRAMUSCULAR; INTRAVENOUS; SUBCUTANEOUS at 04:57

## 2022-01-24 RX ADMIN — METHYLPREDNISOLONE SODIUM SUCCINATE 60 MG: 40 INJECTION, POWDER, FOR SOLUTION INTRAMUSCULAR; INTRAVENOUS at 08:55

## 2022-01-24 RX ADMIN — GUAIFENESIN 1200 MG: 600 TABLET, EXTENDED RELEASE ORAL at 08:44

## 2022-01-24 RX ADMIN — WATER 2 G: 1 INJECTION INTRAMUSCULAR; INTRAVENOUS; SUBCUTANEOUS at 23:30

## 2022-01-24 RX ADMIN — SODIUM CHLORIDE, PRESERVATIVE FREE 10 ML: 5 INJECTION INTRAVENOUS at 23:29

## 2022-01-24 RX ADMIN — ZONISAMIDE 300 MG: 100 CAPSULE ORAL at 08:44

## 2022-01-24 RX ADMIN — VANCOMYCIN HYDROCHLORIDE 1000 MG: 1 INJECTION, POWDER, LYOPHILIZED, FOR SOLUTION INTRAVENOUS at 19:00

## 2022-01-24 RX ADMIN — METHYLPREDNISOLONE SODIUM SUCCINATE 60 MG: 40 INJECTION, POWDER, FOR SOLUTION INTRAMUSCULAR; INTRAVENOUS at 17:09

## 2022-01-24 RX ADMIN — METHYLPREDNISOLONE SODIUM SUCCINATE 60 MG: 40 INJECTION, POWDER, FOR SOLUTION INTRAMUSCULAR; INTRAVENOUS at 23:29

## 2022-01-24 RX ADMIN — LEVOTHYROXINE SODIUM 112 MCG: 0.11 TABLET ORAL at 08:44

## 2022-01-24 RX ADMIN — VANCOMYCIN HYDROCHLORIDE 1000 MG: 1 INJECTION, POWDER, LYOPHILIZED, FOR SOLUTION INTRAVENOUS at 04:55

## 2022-01-24 NOTE — PROGRESS NOTES
Bedside and Verbal shift change report given to Polina (oncoming nurse) by Shavon Zhang (offgoing nurse). Report included the following information SBAR, Kardex and MAR.

## 2022-01-24 NOTE — PROGRESS NOTES
Ton [facility]  [address]  [phone]      Medical Progress Note      NAME: Suzanne Mccall   :  1960  MRM:  [id number]    Date/Time of service: 2022         Subjective:     Chief Complaint:  Patient was personally seen and examined by me during this time period. Chart reviewed. F/u acute resp failure. Remains on 3 liters NC. Endorses improved dyspnea and cough. Objective:       Vitals:       Last 24hrs VS reviewed since prior progress note. Most recent are:    Visit Vitals  /67   Pulse 91   Temp 98.1 °F (36.7 °C)   Resp 20   Ht 5' 9\" (1.753 m)   Wt 83.9 kg (185 lb)   SpO2 91%   BMI 27.32 kg/m²     SpO2 Readings from Last 6 Encounters:   22 91%   21 92%   14 98%   13 98%   12 98%    O2 Flow Rate (L/min): 4 l/min       Intake/Output Summary (Last 24 hours) at 2022 1104  Last data filed at 2022 1538  Gross per 24 hour   Intake 340 ml   Output --   Net 340 ml        Exam:     Physical Exam:    Gen:  Well-developed, well-nourished, in no acute distress  HEENT:  Pink conjunctivae, PERRL, hearing intact to voice  Resp:  No accessory muscle use, crackles b/l   Card:  Tachycardia, No murmurs, normal S1, S2, no peripheral edema  Abd:  Soft, non-tender, non-distended, normoactive bowel sounds are present  Musc:  No cyanosis or clubbing  Skin:  No rashes or ulcers, skin turgor is good  Neuro:  Cranial nerves 3-12 are grossly intact,  follows commands appropriately  Psych:  Oriented to person, place, and time, Alert with good insight      Medications Reviewed: (see below)    Lab Data Reviewed: (see below)    ______________________________________________________________________    Medications:     Current Facility-Administered Medications   Medication Dose Route Frequency    methylPREDNISolone (PF) (Solu-MEDROL) injection 60 mg  60 mg IntraVENous Q8H    [START ON 2022] Vancomycin trough level 1/25 @ 0330   Other ONCE    sodium chloride (NS) flush 5-40 mL  5-40 mL IntraVENous Q8H    sodium chloride (NS) flush 5-40 mL  5-40 mL IntraVENous PRN    acetaminophen (TYLENOL) tablet 650 mg  650 mg Oral Q6H PRN    Or    acetaminophen (TYLENOL) suppository 650 mg  650 mg Rectal Q6H PRN    polyethylene glycol (MIRALAX) packet 17 g  17 g Oral DAILY PRN    ondansetron (ZOFRAN ODT) tablet 4 mg  4 mg Oral Q8H PRN    Or    ondansetron (ZOFRAN) injection 4 mg  4 mg IntraVENous Q6H PRN    enoxaparin (LOVENOX) injection 40 mg  40 mg SubCUTAneous DAILY    amitriptyline (ELAVIL) tablet 30 mg  30 mg Oral QHS    ipratropium-albuterol (COMBIVENT RESPIMAT) 20 mcg-100 mcg inhalation spray  1 Puff Inhalation Q6H PRN    levothyroxine (SYNTHROID) tablet 112 mcg  112 mcg Oral ACB    zonisamide (ZONEGRAN) capsule 300 mg  300 mg Oral DAILY    valACYclovir (VALTREX) tablet 500 mg  500 mg Oral DAILY    trospium (SANCTURA) tablet 20 mg  20 mg Oral BID    azithromycin (ZITHROMAX) 500 mg in 0.9% sodium chloride 250 mL (VIAL-MATE)  500 mg IntraVENous Q24H    cefepime (MAXIPIME) 2 g in sterile water (preservative free) 10 mL IV syringe  2 g IntraVENous Q8H    guaiFENesin ER (MUCINEX) tablet 1,200 mg  1,200 mg Oral BID    vancomycin (VANCOCIN) 1,000 mg in 0.9% sodium chloride 250 mL (VIAL-MATE)  1,000 mg IntraVENous Q12H          Lab Review:     Recent Labs     01/24/22  0449 01/23/22  1012   WBC 9.0 9.9   HGB 11.6 13.3   HCT 34.9* 39.3   * 592*     Recent Labs     01/24/22  0449 01/23/22  1012   * 134*   K 4.0 3.8    102   CO2 26 25   * 128*   BUN 10 11   CREA 0.65 0.73   CA 9.0 9.3   MG 2.3 2.2   ALB 2.1* 2.4*   TBILI 0.5 0.5   ALT 15 17     Lab Results   Component Value Date/Time    Glucose (POC) 116 (H) 04/24/2013 12:03 PM    Glucose (POC) 159 (H) 04/24/2013 05:44 AM    Glucose (POC) 161 (H) 04/23/2013 11:44 PM    Glucose (POC) 239 (H) 04/23/2013 06:15 PM    Glucose (POC) 111 (H) 04/23/2013 12:20 PM          Assessment / Plan:     Acute respiratory failure with Hypoxia (1/23/2022)/dyspnea: Hypoxic to 88% on room air on arrival; currently on 3L  nasal cannula. Unclear etiology but concern for need for further abx given recent COVID PNA or concern for for infectious etiology how procal normal and no signs of fevers or WBC. CTA negative for PE. Follow Blood cultures and pneumonia serologies. Obtain MRSA. Broad-spectrum antibiotics including IV vancomycin, cefepime and azithromycin; will likely stop tomorrow if cx remain neg. RVP neg. Consider HRCT OP. Incentive spirometry. Probnp ok; check echo. Duonebs PRN. pulmonary following.     Heart palpitations: TSH wnl. Monitor on telemetry for any arrhythmias. Seizure disorder ([facility] [address] (4/18/2013): continue home Zonegran      Hypothyroidism (4/18/2013):  TSH wnl. Continue levothyroxine     Urinary retention: continue home detrol.      Depression: continue home elavil.      Total time spent with patient: 32 Minutes **I personally saw and examined the patient during this time period**                 Care Plan discussed with: Patient and Nursing Staff    Discussed:  Care Plan    Prophylaxis:  Lovenox    Disposition:  Home w/Family           ___________________________________________________    Attending Physician: Grace Rutledge DO

## 2022-01-24 NOTE — CONSULTS
[facility]     Name: Katherine Comer MRN: [id number]   : 1960 Hospital: [address]   Date: 2022        Impression Plan   1. Acute respiratory failure  2. hypoxia  3. Shortness of breath  4. cough  5. Hx of COVID 19 PNA                 · Suspect that shortness of breath and hypoxia worsened once steroids wore off, based on timing of events. Will add back IV solumedrol and see how pt does. Procalcitonin low and CT chest with what appears to be stable infiltrates consistent with known COVID 19 PNA  · methylpred 60 mg q8h  · Echo to look for any cardiomyopathy  · Wean O2 as tolerated, keeping sats above 90%  · OOB into chair  · IS           Radiology  ( personally reviewed) CTA chest : negative for PE and infiltrates consistent with COVID 19. Procalcitonin was low but CRP markedly elevated compared to discharge. ABG No results for input(s): PHI, PO2I, PCO2I in the last 72 hours. Subjective     Cc: increasing shortness of breath    63 yo with PMHx of hypothyroidism and recent hospitalization for COVID 19 PNA -22 presenting for increasing shortness of breath. Patient was discharged on 6L O2 after threatning to leave AMA. She was not discharged AMA so that Oxygen could be provided for her. Pt states that in the next few days she watched her sats closely and was consistently in the 90s. Her last dose of steroids was on 2022. Around 2022 she started feeling more short of breath again and sats would not sustain in the 90s. She developed a dry cough. She denies fevers/chills. CXR on this admission looks unchanged from . CTA negative for PE and infiltrates consistent with COVID 19. Procalcitonin was low but CRP markedly elevated compared to discharge. Review of Systems:  A comprehensive review of systems was negative except for that written in the HPI.     Past Medical History:   Diagnosis Date    Autoimmune disease ([facility] [address]     hypothroidism    Neurological disorder     seizures      Past Surgical History:   Procedure Laterality Date    HX ORTHOPAEDIC  1996    ruptureed disc      Prior to Admission medications    Medication Sig Start Date End Date Taking? Authorizing Provider   levoFLOXacin (Levaquin) 500 mg tablet Take 500 mg by mouth daily. 1/19/22 1/28/22 Yes Provider, Historical   dextroamphetamine-amphetamine (AdderalL) 10 mg tablet Take 10 mg by mouth two (2) times daily as needed (attention). Yes Provider, Historical   valACYclovir (Valtrex) 500 mg tablet Take 500 mg by mouth daily. Yes Provider, Historical   tolterodine (DETROL) 2 mg tablet Take 2 mg by mouth two (2) times a day. Yes Provider, Historical   amitriptyline (ELAVIL) 10 mg tablet Take 30 mg by mouth nightly. Yes Provider, Historical   levothyroxine (LevoxyL) 112 mcg tablet Take 112 mcg by mouth Daily (before breakfast). Yes Provider, Historical   zonisamide (ZONEGRAN) 100 mg capsule Take 300 mg by mouth daily.    Yes Provider, Historical     Current Facility-Administered Medications   Medication Dose Route Frequency    methylPREDNISolone (PF) (Solu-MEDROL) injection 60 mg  60 mg IntraVENous Q8H    sodium chloride (NS) flush 5-40 mL  5-40 mL IntraVENous Q8H    enoxaparin (LOVENOX) injection 40 mg  40 mg SubCUTAneous DAILY    amitriptyline (ELAVIL) tablet 30 mg  30 mg Oral QHS    levothyroxine (SYNTHROID) tablet 112 mcg  112 mcg Oral ACB    zonisamide (ZONEGRAN) capsule 300 mg  300 mg Oral DAILY    valACYclovir (VALTREX) tablet 500 mg  500 mg Oral DAILY    trospium (SANCTURA) tablet 20 mg  20 mg Oral BID    azithromycin (ZITHROMAX) 500 mg in 0.9% sodium chloride 250 mL (VIAL-MATE)  500 mg IntraVENous Q24H    cefepime (MAXIPIME) 2 g in sterile water (preservative free) 10 mL IV syringe  2 g IntraVENous Q8H    guaiFENesin ER (MUCINEX) tablet 1,200 mg  1,200 mg Oral BID    vancomycin (VANCOCIN) 1,000 mg in 0.9% sodium chloride 250 mL (VIAL-MATE)  1,000 mg IntraVENous Q12H     No Known Allergies   Social History     Tobacco Use    Smoking status: Former Smoker    Smokeless tobacco: Current User   Substance Use Topics    Alcohol use: Yes     Comment: rarely      No family history on file. Laboratory: I have personally reviewed the critical care flowsheet and labs. Recent Labs     01/24/22  0449 01/23/22  1012   WBC 9.0 9.9   HGB 11.6 13.3   HCT 34.9* 39.3   * 592*     Recent Labs     01/24/22  0449 01/23/22  1012   * 134*   K 4.0 3.8    102   CO2 26 25   * 128*   BUN 10 11   CREA 0.65 0.73   CA 9.0 9.3   MG 2.3 2.2   ALB 2.1* 2.4*   ALT 15 17       Objective:     Mode Rate Tidal Volume Pressure FiO2 PEEP            96 %       Vital Signs:     TMAX(24)      Intake/Output:   Last shift:         Last 3 shifts: No intake/output data recorded. [id number]    Intake/Output Summary (Last 24 hours) at 1/24/2022 0836  Last data filed at 1/23/2022 1538  Gross per 24 hour   Intake 340 ml   Output --   Net 340 ml     EXAM:   GENERAL: awake, alert, HEENT:  PERRL, EOMI, no alar flaring or epistaxis, oral mucosa moist without cyanosis, NECK:  no jugular vein distention, no retractions, no thyromegaly or masses, LUNGS: Crackles bilaterally, HEART:  Regular rate and rhythm with no MGR; no edema is present, ABDOMEN:  soft with no tenderness, bowel sounds present, EXTREMITIES:  warm with no cyanosis, SKIN:  no jaundice or ecchymosis and NEUROLOGIC:  alert and oriented, grossly non-focal    Claudette Piedra MD  [facility]

## 2022-01-24 NOTE — PROGRESS NOTES
1/24/2022  1:07 PM  CM complotted assessment w/ pt via phone, CM did not enter the pt's room, d/t COVID 19 restrictions  Charted demographics verified. Reason for Readmission:     Emergent for hypoxia    Admission 12/26/-12/31/21 COVID 19 PNA pt discharged to home w/ home  O2 through [facility] and outpatient f/u  Pt is a 63 yo  female who presents to [facility] c/o worsening dyspnea and hypoxia. Pt returned Home O2 she was discharged w/ to [address] after a few days  PMHx:    Autoimmune disease ([facility] [address]       hypothroidism    Neurological disorder       seizures         RUR Score/Risk Level:    *% Low Risk of Readmission/Green     PCP: First and Last name:  Harriet Diaz MD   Name of Practice:    Are you a current patient: Yes/No: Yes   Approximate date of last visit: 2 weeks ago   Can you participate in a virtual visit with your PCP: YES    Is a Care Conference indicated: NO      Did you attend your follow up appointment (s): If not, why not:  Yes  virtual visit w/ PCP       Resources/supports as identified by patient/family: pt has supportive spouse who can assist if needed          Top Challenges facing patient (as identified by patient/family and CM): Finances/Medication cost?   Pt is insured, uses [employer] for Rx and [facility]  Transportation      Pt dries, spouse can transport     Support system or lack thereof? Pt has supportive spouse who can assist if needed   Living arrangements? Pt lives w/ spouse in 2 story home w/ 3 entry steps and 13 interior steps that go down to her bed/bath      Self-care/ADLs/Cognition? Pt can perform all ADLS independently        Current Advanced Directive/Advance Care Plan:  FULL Code,   [facility] spouse mAinata Miller C) [phone]           Plan for utilizing home health:   NO history of HH or Rehab             Transition of Care Plan:    Based on readmission, the patient's previous Plan of Care   has been evaluated and/or modified.  The current Transition of Care Plan is:         1. Hospital admission for medical management   2. Dyspnea, pt on 4 L O2, oximetry prior to DC, CM to follow for home O2 needs   3. Pulmonary consult  4. CM to follow through for treatment/response  5. DC when stable to home  6. Outpatient f/u PCP, pulmonary  7. Family to transport      Care Management Interventions  PCP Verified by CM:  Yes Cristina Spatz, MD, last visit 2 weeks )  Palliative Care Criteria Met (RRAT>21 & CHF Dx)?: No  Mode of Transport at Discharge: Self (spouse)  Physical Therapy Consult: No  Occupational Therapy Consult: No  Speech Therapy Consult: No  Support Systems: Spouse/Significant Other (pt lives w/ spouse in pvt residence, at baseline pt is ambulatory, iADLS, drives,)  Confirm Follow Up Transport: Self  Discharge Location  Patient Expects to be Discharged to[de-identified] Home with outpatient services  Readmission Assessment  Number of days since last admission?: 8-30 days  Previous disposition: Home with Family  Who is being interviewed?: Patient  What was the patient's/caregiver's perception as to why they think they needed to return back to the hospital?: Other (Comment) (Increasing SOB)  Did you visit your Primary Care Physician after you left the hospital, before you returned this time?: Yes (pt had virtual visit)  Did you see a specialist, such as Cardiac, Pulmonary, Orthopedic Physician, etc. after you left the hospital?: No  Who advised the patient to return to the hospital?: Self-referral  Does the patient report anything that got in the way of taking their medications?: No (pt reports filling all her medications)  In our efforts to provide the best possible care to you and others like you, can you think of anything that we could have done to help you after you left the hospital the first time, so that you might not have needed to return so soon?: Other (Comment) (Nothing [facility] could have done)  MARTITA Eaton

## 2022-01-25 LAB
COMMENT, HOLDF: NORMAL
DATE LAST DOSE: ABNORMAL
REPORTED DOSE,DOSE: ABNORMAL UNITS
REPORTED DOSE/TIME,TMG: ABNORMAL
SAMPLES BEING HELD,HOLD: NORMAL
VANCOMYCIN TROUGH SERPL-MCNC: 10.9 UG/ML (ref 5–10)

## 2022-01-25 PROCEDURE — 74011000250 HC RX REV CODE- 250: Performed by: INTERNAL MEDICINE

## 2022-01-25 PROCEDURE — 36415 COLL VENOUS BLD VENIPUNCTURE: CPT

## 2022-01-25 PROCEDURE — 74011250636 HC RX REV CODE- 250/636: Performed by: INTERNAL MEDICINE

## 2022-01-25 PROCEDURE — 87449 NOS EACH ORGANISM AG IA: CPT

## 2022-01-25 PROCEDURE — 80202 ASSAY OF VANCOMYCIN: CPT

## 2022-01-25 PROCEDURE — 94760 N-INVAS EAR/PLS OXIMETRY 1: CPT

## 2022-01-25 PROCEDURE — 94761 N-INVAS EAR/PLS OXIMETRY MLT: CPT

## 2022-01-25 PROCEDURE — 77010033678 HC OXYGEN DAILY

## 2022-01-25 PROCEDURE — 74011250637 HC RX REV CODE- 250/637: Performed by: INTERNAL MEDICINE

## 2022-01-25 PROCEDURE — 65270000029 HC RM PRIVATE

## 2022-01-25 PROCEDURE — 87899 AGENT NOS ASSAY W/OPTIC: CPT

## 2022-01-25 RX ADMIN — SODIUM CHLORIDE, PRESERVATIVE FREE 10 ML: 5 INJECTION INTRAVENOUS at 23:34

## 2022-01-25 RX ADMIN — SODIUM CHLORIDE, PRESERVATIVE FREE 10 ML: 5 INJECTION INTRAVENOUS at 16:13

## 2022-01-25 RX ADMIN — METHYLPREDNISOLONE SODIUM SUCCINATE 60 MG: 40 INJECTION, POWDER, FOR SOLUTION INTRAMUSCULAR; INTRAVENOUS at 16:04

## 2022-01-25 RX ADMIN — VANCOMYCIN HYDROCHLORIDE 1250 MG: 1.25 INJECTION, POWDER, LYOPHILIZED, FOR SOLUTION INTRAVENOUS at 05:30

## 2022-01-25 RX ADMIN — SODIUM CHLORIDE, PRESERVATIVE FREE 10 ML: 5 INJECTION INTRAVENOUS at 05:31

## 2022-01-25 RX ADMIN — GUAIFENESIN 1200 MG: 600 TABLET, EXTENDED RELEASE ORAL at 19:53

## 2022-01-25 RX ADMIN — LEVOTHYROXINE SODIUM 112 MCG: 0.11 TABLET ORAL at 05:32

## 2022-01-25 RX ADMIN — ZONISAMIDE 300 MG: 100 CAPSULE ORAL at 16:12

## 2022-01-25 RX ADMIN — TROSPIUM CHLORIDE 20 MG: 20 TABLET, FILM COATED ORAL at 16:00

## 2022-01-25 RX ADMIN — GUAIFENESIN 1200 MG: 600 TABLET, EXTENDED RELEASE ORAL at 16:00

## 2022-01-25 RX ADMIN — TROSPIUM CHLORIDE 20 MG: 20 TABLET, FILM COATED ORAL at 19:53

## 2022-01-25 RX ADMIN — ENOXAPARIN SODIUM 40 MG: 100 INJECTION SUBCUTANEOUS at 16:01

## 2022-01-25 RX ADMIN — METHYLPREDNISOLONE SODIUM SUCCINATE 60 MG: 40 INJECTION, POWDER, FOR SOLUTION INTRAMUSCULAR; INTRAVENOUS at 05:30

## 2022-01-25 RX ADMIN — AZITHROMYCIN MONOHYDRATE 500 MG: 500 INJECTION, POWDER, LYOPHILIZED, FOR SOLUTION INTRAVENOUS at 15:28

## 2022-01-25 RX ADMIN — AMITRIPTYLINE HYDROCHLORIDE 30 MG: 10 TABLET, FILM COATED ORAL at 23:33

## 2022-01-25 RX ADMIN — METHYLPREDNISOLONE SODIUM SUCCINATE 60 MG: 40 INJECTION, POWDER, FOR SOLUTION INTRAMUSCULAR; INTRAVENOUS at 23:33

## 2022-01-25 RX ADMIN — WATER 2 G: 1 INJECTION INTRAMUSCULAR; INTRAVENOUS; SUBCUTANEOUS at 05:30

## 2022-01-25 RX ADMIN — VALACYCLOVIR HYDROCHLORIDE 500 MG: 500 TABLET, FILM COATED ORAL at 16:00

## 2022-01-25 NOTE — PROGRESS NOTES
Ton Mckeon [facility]  [address]  [phone]      Medical Progress Note      NAME: Little Lynne   :  1960  MRM:  [id number]    Date/Time of service: 2022         Subjective:     Chief Complaint:  Patient was personally seen and examined by me during this time period. Chart reviewed. F/u acute resp failure. Remains on 3-4 liters NC. States dyspnea and cough are worse today. Objective:       Vitals:       Last 24hrs VS reviewed since prior progress note. Most recent are:    Visit Vitals  /71   Pulse 96   Temp 97.7 °F (36.5 °C)   Resp 20   Ht 5' 9\" (1.753 m)   Wt 83.9 kg (185 lb)   SpO2 93%   BMI 27.32 kg/m²     SpO2 Readings from Last 6 Encounters:   22 93%   21 92%   14 98%   13 98%   12 98%    O2 Flow Rate (L/min): 4 l/min     No intake or output data in the 24 hours ending 22 5116     Exam:     Physical Exam:    Gen:  Well-developed, well-nourished, in no acute distress  HEENT:  Pink conjunctivae, PERRL, hearing intact to voice  Resp:  No accessory muscle use, crackles b/l bases  Card:  Tachycardia, No murmurs, normal S1, S2, no peripheral edema  Abd:  Soft, non-tender, non-distended, normoactive bowel sounds are present  Musc:  No cyanosis or clubbing  Skin:  No rashes or ulcers, skin turgor is good  Neuro:  Cranial nerves 3-12 are grossly intact,  follows commands appropriately  Psych:  Oriented to person, place, and time, Alert with good insight      Medications Reviewed: (see below)    Lab Data Reviewed: (see below)    ______________________________________________________________________    Medications:     Current Facility-Administered Medications   Medication Dose Route Frequency    methylPREDNISolone (PF) (Solu-MEDROL) injection 60 mg  60 mg IntraVENous Q8H    sodium chloride (NS) flush 5-40 mL  5-40 mL IntraVENous Q8H    sodium chloride (NS) flush 5-40 mL  5-40 mL IntraVENous PRN    acetaminophen (TYLENOL) tablet 650 mg  650 mg Oral Q6H PRN    Or    acetaminophen (TYLENOL) suppository 650 mg  650 mg Rectal Q6H PRN    polyethylene glycol (MIRALAX) packet 17 g  17 g Oral DAILY PRN    ondansetron (ZOFRAN ODT) tablet 4 mg  4 mg Oral Q8H PRN    Or    ondansetron (ZOFRAN) injection 4 mg  4 mg IntraVENous Q6H PRN    enoxaparin (LOVENOX) injection 40 mg  40 mg SubCUTAneous DAILY    amitriptyline (ELAVIL) tablet 30 mg  30 mg Oral QHS    ipratropium-albuterol (COMBIVENT RESPIMAT) 20 mcg-100 mcg inhalation spray  1 Puff Inhalation Q6H PRN    levothyroxine (SYNTHROID) tablet 112 mcg  112 mcg Oral ACB    zonisamide (ZONEGRAN) capsule 300 mg  300 mg Oral DAILY    valACYclovir (VALTREX) tablet 500 mg  500 mg Oral DAILY    trospium (SANCTURA) tablet 20 mg  20 mg Oral BID    azithromycin (ZITHROMAX) 500 mg in 0.9% sodium chloride 250 mL (VIAL-MATE)  500 mg IntraVENous Q24H    guaiFENesin ER (MUCINEX) tablet 1,200 mg  1,200 mg Oral BID          Lab Review:     Recent Labs     01/24/22  0449 01/23/22  1012   WBC 9.0 9.9   HGB 11.6 13.3   HCT 34.9* 39.3   * 592*     Recent Labs     01/24/22  0449 01/23/22  1012   * 134*   K 4.0 3.8    102   CO2 26 25   * 128*   BUN 10 11   CREA 0.65 0.73   CA 9.0 9.3   MG 2.3 2.2   ALB 2.1* 2.4*   TBILI 0.5 0.5   ALT 15 17     Lab Results   Component Value Date/Time    Glucose (POC) 116 (H) 04/24/2013 12:03 PM    Glucose (POC) 159 (H) 04/24/2013 05:44 AM    Glucose (POC) 161 (H) 04/23/2013 11:44 PM    Glucose (POC) 239 (H) 04/23/2013 06:15 PM    Glucose (POC) 111 (H) 04/23/2013 12:20 PM          Assessment / Plan:     Acute respiratory failure with Hypoxia (1/23/2022)/dyspnea: Hypoxic to 88% on room air on arrival; currently on 3 -4L  nasal cannula. Unclear etiology but concern for need for further steroids given recent COVID PNA or concern for for infectious etiology however procal normal and no signs of fevers or WBC.  CTA negative for PE. Follow Blood cultures and pneumonia serologies. Follow MRSA. Given neg cx thus far stop  IV vancomycin and cefepime; c/w azithromycin. RVP neg. Consider HRCT OP. Incentive spirometry. Probnp ok; echo w/ normal ef, normal wall motion. No valve concerns. Duonebs PRN. IV steroids - wean as able. pulmonary following. Will need 6 minute walk prior to DC and likely home 02.      Heart palpitations: TSH wnl. Monitor on telemetry for any arrhythmias. Seizure disorder ([address] (4/18/2013): continue home Zonegran      Hypothyroidism (4/18/2013):  TSH wnl. Continue levothyroxine     Urinary retention: continue home detrol.      Depression: continue home elavil.      Total time spent with patient: 32 Minutes **I personally saw and examined the patient during this time period**                 Care Plan discussed with: Patient and Nursing Staff    Discussed:  Care Plan    Prophylaxis:  Lovenox    Disposition:  Home w/Family           ___________________________________________________    Attending Physician: Greer Bowers DO

## 2022-01-25 NOTE — PROGRESS NOTES
[facility]     Name: Marcelino Ruby MRN: [id number]   : 1960 Hospital: [address]   Date: 2022        Impression Plan   1. Acute respiratory failure  2. hypoxia  3. Shortness of breath  4. cough  5. Hx of COVID 19 PNA                   · methylpred 60 mg q8h; potentially to steroid taper tomorrow  · Wean O2 as tolerated, keeping sats above 90%  · OOB into chair  · IS  · Follow-up in pulmonary clinic within 1-2 weeks           Radiology  ( personally reviewed) CTA chest : negative for PE and infiltrates consistent with COVID 19. Procalcitonin was low but CRP markedly elevated compared to discharge. ABG No results for input(s): PHI, PO2I, PCO2I in the last 72 hours. Subjective     Cc: increasing shortness of breath    63 yo with PMHx of hypothyroidism and recent hospitalization for COVID 19 PNA -22 presenting for increasing shortness of breath. Patient was discharged on 6L O2 after threatning to leave AMA. She was not discharged AMA so that Oxygen could be provided for her. Pt states that in the next few days she watched her sats closely and was consistently in the 90s. Her last dose of steroids was on 2022. Around 2022 she started feeling more short of breath again and sats would not sustain in the 90s. She developed a dry cough. She denies fevers/chills. CXR on this admission looks unchanged from . CTA negative for PE and infiltrates consistent with COVID 19. Procalcitonin was low but CRP markedly elevated compared to discharge. Review of Systems:  A comprehensive review of systems was negative except for that written in the HPI. Interval History:    Afebrile  BP stable  Sats 92% on 4L NC  CRP 10.6--decreased  LFTs stable   blood cultures no growth x 2 days   ECHO:  EF 60-65%, valves normal    ROS:  Feeling better overall. Still SOB and tachycardic into 110s with  brushing teeth/standing.     Past Medical History: Diagnosis Date    Autoimmune disease ([facility] [address]     hypothroidism    Neurological disorder     seizures      Past Surgical History:   Procedure Laterality Date    HX ORTHOPAEDIC  1996    ruptureed disc      Prior to Admission medications    Medication Sig Start Date End Date Taking? Authorizing Provider   levoFLOXacin (Levaquin) 500 mg tablet Take 500 mg by mouth daily. 1/19/22 1/28/22 Yes Provider, Historical   dextroamphetamine-amphetamine (AdderalL) 10 mg tablet Take 10 mg by mouth two (2) times daily as needed (attention). Yes Provider, Historical   valACYclovir (Valtrex) 500 mg tablet Take 500 mg by mouth daily. Yes Provider, Historical   tolterodine (DETROL) 2 mg tablet Take 2 mg by mouth two (2) times a day. Yes Provider, Historical   amitriptyline (ELAVIL) 10 mg tablet Take 30 mg by mouth nightly. Yes Provider, Historical   levothyroxine (LevoxyL) 112 mcg tablet Take 112 mcg by mouth Daily (before breakfast). Yes Provider, Historical   zonisamide (ZONEGRAN) 100 mg capsule Take 300 mg by mouth daily.    Yes Provider, Historical     Current Facility-Administered Medications   Medication Dose Route Frequency    vancomycin (VANCOCIN) 1,250 mg in 0.9% sodium chloride 250 mL (VIAL-MATE)  1,250 mg IntraVENous Q12H    methylPREDNISolone (PF) (Solu-MEDROL) injection 60 mg  60 mg IntraVENous Q8H    sodium chloride (NS) flush 5-40 mL  5-40 mL IntraVENous Q8H    enoxaparin (LOVENOX) injection 40 mg  40 mg SubCUTAneous DAILY    amitriptyline (ELAVIL) tablet 30 mg  30 mg Oral QHS    levothyroxine (SYNTHROID) tablet 112 mcg  112 mcg Oral ACB    zonisamide (ZONEGRAN) capsule 300 mg  300 mg Oral DAILY    valACYclovir (VALTREX) tablet 500 mg  500 mg Oral DAILY    trospium (SANCTURA) tablet 20 mg  20 mg Oral BID    azithromycin (ZITHROMAX) 500 mg in 0.9% sodium chloride 250 mL (VIAL-MATE)  500 mg IntraVENous Q24H    cefepime (MAXIPIME) 2 g in sterile water (preservative free) 10 mL IV syringe  2 g IntraVENous Q8H    guaiFENesin ER (MUCINEX) tablet 1,200 mg  1,200 mg Oral BID     No Known Allergies   Social History     Tobacco Use    Smoking status: Former Smoker    Smokeless tobacco: Current User   Substance Use Topics    Alcohol use: Yes     Comment: rarely      No family history on file. Laboratory: I have personally reviewed the critical care flowsheet and labs. Recent Labs     01/24/22  0449 01/23/22  1012   WBC 9.0 9.9   HGB 11.6 13.3   HCT 34.9* 39.3   * 592*     Recent Labs     01/24/22  0449 01/23/22  1012   * 134*   K 4.0 3.8    102   CO2 26 25   * 128*   BUN 10 11   CREA 0.65 0.73   CA 9.0 9.3   MG 2.3 2.2   ALB 2.1* 2.4*   ALT 15 17       Objective:     Mode Rate Tidal Volume Pressure FiO2 PEEP            96 %       Vital Signs:     TMAX(24)      Intake/Output:   Last shift:         Last 3 shifts: No intake/output data recorded. RRIOLAST3  No intake or output data in the 24 hours ending 01/25/22 1109  EXAM:   GENERAL: awake, alert, HEENT:  PERRL, EOMI, no alar flaring or epistaxis, oral mucosa moist without cyanosis, NECK:  no jugular vein distention, no retractions, no thyromegaly or masses, LUNGS: Crackles bilaterally HEART:  Regular rate and rhythm with no MGR; no edema is present, ABDOMEN:  soft with no tenderness, bowel sounds present, EXTREMITIES:  warm with no cyanosis, SKIN:  no jaundice or ecchymosis and NEUROLOGIC:  alert and oriented, grossly non-focal    Beuford General, NP  [facility]

## 2022-01-25 NOTE — PROGRESS NOTES
1/25/2022  CARE MANAGEMENT NOTE:  CM reviewed EMR for clinical updates and handoff received from previous  Leroy Cardenas). READMISSION:  Pt was admitted to [facility] from 12/26/21 - 12/31/21 with COVID. She discharged home with oxygen thru [place]. Reportedly, pt returned the home oxygen after a few days. Pt was readmitted to [facility] on 1/23/22 with acute respiratory failure with hypoxia. Reportedly, pt resides with her  Magdy Burgos ([phone]). RUR 8%    Transition Plan of Care:  1. Pulmonary is following pt for medical management  2. Plan is for pt to return home with her   3. Home oxygen eval prior to discharge  4. Outpt f/u  5.  will transport pt home    CM will continue to follow pt until discharged.   Ayden

## 2022-01-25 NOTE — PROGRESS NOTES
Bedside and Verbal shift change report given to Versa Buerger (oncoming nurse) by Maria Fernanda Latham RN (offgoing nurse). Report included the following information SBAR, Kardex, Recent Results and Quality Measures.

## 2022-01-25 NOTE — PROGRESS NOTES
Pharmacy Dosing Note    Vancomycin level drawn 1/25/2022 at 0259 = 10.9 mcg/mL which predicts AUC of 376. Will adjust regimen to 1250 mg IV every 12 hours (14.9 mg/kg dose) which will predict AUC of 469 based on [facility].      Thank you,  Elijah Capps, PHARMD

## 2022-01-25 NOTE — PROGRESS NOTES
Physician Progress Note      Ananda Mendoza  CSN #:                  [id number]  :                       1960  ADMIT DATE:       2022 9:44 AM  DISCH DATE:  RESPONDING  PROVIDER #:        KANDI OLGUIN DO          QUERY TEXT:    Dear Attending,    Pt admitted with acute hypoxic respiratory failure, noted to have low serum sodium on admission. If possible, please document in the progress notes and discharge summary if you are evaluating and / or treating any of the following: The medical record reflects the following:  Risk Factors: acute illness  Clinical Indicators: Sodium 134 on admission and 133 on   Treatment: monitoring      Thank you,    Latisha Sherman RN  [facility]  [phone]  Options provided:  -- Hyponatremia  -- Clinically insignificant low serum sodium  -- Other - I will add my own diagnosis  -- Disagree - Not applicable / Not valid  -- Disagree - Clinically unable to determine / Unknown  -- Refer to Clinical Documentation Reviewer    PROVIDER RESPONSE TEXT:    This patient has low serum sodium which is clinically insignificant.     Query created by: Jovanna Lewis on 2022 3:18 PM      Electronically signed by:  Sally Styles DO 2022 7:48 PM

## 2022-01-26 VITALS
WEIGHT: 185 LBS | DIASTOLIC BLOOD PRESSURE: 77 MMHG | HEART RATE: 90 BPM | HEIGHT: 69 IN | BODY MASS INDEX: 27.4 KG/M2 | SYSTOLIC BLOOD PRESSURE: 125 MMHG | RESPIRATION RATE: 18 BRPM | TEMPERATURE: 97.5 F | OXYGEN SATURATION: 94 %

## 2022-01-26 LAB
ALBUMIN SERPL-MCNC: 2.2 G/DL (ref 3.5–5)
ALBUMIN/GLOB SERPL: 0.5 {RATIO} (ref 1.1–2.2)
ALP SERPL-CCNC: 67 U/L (ref 45–117)
ALT SERPL-CCNC: 20 U/L (ref 12–78)
ANION GAP SERPL CALC-SCNC: 7 MMOL/L (ref 5–15)
AST SERPL-CCNC: 16 U/L (ref 15–37)
BASOPHILS # BLD: 0 K/UL (ref 0–0.1)
BASOPHILS NFR BLD: 0 % (ref 0–1)
BILIRUB SERPL-MCNC: 0.3 MG/DL (ref 0.2–1)
BUN SERPL-MCNC: 16 MG/DL (ref 6–20)
BUN/CREAT SERPL: 23 (ref 12–20)
CALCIUM SERPL-MCNC: 9.1 MG/DL (ref 8.5–10.1)
CHLORIDE SERPL-SCNC: 106 MMOL/L (ref 97–108)
CO2 SERPL-SCNC: 24 MMOL/L (ref 21–32)
CREAT SERPL-MCNC: 0.7 MG/DL (ref 0.55–1.02)
CRP SERPL-MCNC: 4.2 MG/DL (ref 0–0.6)
DIFFERENTIAL METHOD BLD: ABNORMAL
EOSINOPHIL # BLD: 0 K/UL (ref 0–0.4)
EOSINOPHIL NFR BLD: 0 % (ref 0–7)
ERYTHROCYTE [DISTWIDTH] IN BLOOD BY AUTOMATED COUNT: 13.1 % (ref 11.5–14.5)
GLOBULIN SER CALC-MCNC: 4.4 G/DL (ref 2–4)
GLUCOSE SERPL-MCNC: 191 MG/DL (ref 65–100)
HCT VFR BLD AUTO: 34 % (ref 35–47)
HGB BLD-MCNC: 11.5 G/DL (ref 11.5–16)
IMM GRANULOCYTES # BLD AUTO: 0 K/UL
IMM GRANULOCYTES NFR BLD AUTO: 0 %
LYMPHOCYTES # BLD: 1.9 K/UL (ref 0.8–3.5)
LYMPHOCYTES NFR BLD: 10 % (ref 12–49)
MAGNESIUM SERPL-MCNC: 2.5 MG/DL (ref 1.6–2.4)
MCH RBC QN AUTO: 30.6 PG (ref 26–34)
MCHC RBC AUTO-ENTMCNC: 33.8 G/DL (ref 30–36.5)
MCV RBC AUTO: 90.4 FL (ref 80–99)
MONOCYTES # BLD: 0.2 K/UL (ref 0–1)
MONOCYTES NFR BLD: 1 % (ref 5–13)
MYELOCYTES NFR BLD MANUAL: 3 %
NEUTS BAND NFR BLD MANUAL: 3 % (ref 0–6)
NEUTS SEG # BLD: 16 K/UL (ref 1.8–8)
NEUTS SEG NFR BLD: 83 % (ref 32–75)
NRBC # BLD: 0 K/UL (ref 0–0.01)
NRBC BLD-RTO: 0 PER 100 WBC
PLATELET # BLD AUTO: 641 K/UL (ref 150–400)
PMV BLD AUTO: 8.3 FL (ref 8.9–12.9)
POTASSIUM SERPL-SCNC: 4.2 MMOL/L (ref 3.5–5.1)
PROT SERPL-MCNC: 6.6 G/DL (ref 6.4–8.2)
RBC # BLD AUTO: 3.76 M/UL (ref 3.8–5.2)
RBC MORPH BLD: ABNORMAL
SODIUM SERPL-SCNC: 137 MMOL/L (ref 136–145)
WBC # BLD AUTO: 18.6 K/UL (ref 3.6–11)

## 2022-01-26 PROCEDURE — 83735 ASSAY OF MAGNESIUM: CPT

## 2022-01-26 PROCEDURE — 94761 N-INVAS EAR/PLS OXIMETRY MLT: CPT

## 2022-01-26 PROCEDURE — 74011250636 HC RX REV CODE- 250/636: Performed by: INTERNAL MEDICINE

## 2022-01-26 PROCEDURE — 74011000250 HC RX REV CODE- 250: Performed by: INTERNAL MEDICINE

## 2022-01-26 PROCEDURE — 77010033678 HC OXYGEN DAILY

## 2022-01-26 PROCEDURE — 74011250637 HC RX REV CODE- 250/637: Performed by: INTERNAL MEDICINE

## 2022-01-26 PROCEDURE — 86140 C-REACTIVE PROTEIN: CPT

## 2022-01-26 PROCEDURE — 36415 COLL VENOUS BLD VENIPUNCTURE: CPT

## 2022-01-26 PROCEDURE — 94618 PULMONARY STRESS TESTING: CPT

## 2022-01-26 PROCEDURE — 80053 COMPREHEN METABOLIC PANEL: CPT

## 2022-01-26 PROCEDURE — 85025 COMPLETE CBC W/AUTO DIFF WBC: CPT

## 2022-01-26 RX ORDER — AZITHROMYCIN 250 MG/1
500 TABLET, FILM COATED ORAL ONCE
Status: COMPLETED | OUTPATIENT
Start: 2022-01-26 | End: 2022-01-26

## 2022-01-26 RX ORDER — PREDNISONE 20 MG/1
40 TABLET ORAL 2 TIMES DAILY WITH MEALS
Status: DISCONTINUED | OUTPATIENT
Start: 2022-01-26 | End: 2022-01-26 | Stop reason: HOSPADM

## 2022-01-26 RX ORDER — PREDNISONE 20 MG/1
TABLET ORAL
Qty: 15 TABLET | Refills: 0 | Status: SHIPPED | OUTPATIENT
Start: 2022-01-26

## 2022-01-26 RX ADMIN — SODIUM CHLORIDE, PRESERVATIVE FREE 10 ML: 5 INJECTION INTRAVENOUS at 04:57

## 2022-01-26 RX ADMIN — AZITHROMYCIN MONOHYDRATE 500 MG: 250 TABLET ORAL at 14:30

## 2022-01-26 RX ADMIN — TROSPIUM CHLORIDE 20 MG: 20 TABLET, FILM COATED ORAL at 09:00

## 2022-01-26 RX ADMIN — SODIUM CHLORIDE, PRESERVATIVE FREE 10 ML: 5 INJECTION INTRAVENOUS at 14:31

## 2022-01-26 RX ADMIN — GUAIFENESIN 1200 MG: 600 TABLET, EXTENDED RELEASE ORAL at 09:00

## 2022-01-26 RX ADMIN — ENOXAPARIN SODIUM 40 MG: 100 INJECTION SUBCUTANEOUS at 09:00

## 2022-01-26 RX ADMIN — VALACYCLOVIR HYDROCHLORIDE 500 MG: 500 TABLET, FILM COATED ORAL at 09:00

## 2022-01-26 RX ADMIN — LEVOTHYROXINE SODIUM 112 MCG: 0.11 TABLET ORAL at 04:55

## 2022-01-26 RX ADMIN — METHYLPREDNISOLONE SODIUM SUCCINATE 60 MG: 40 INJECTION, POWDER, FOR SOLUTION INTRAMUSCULAR; INTRAVENOUS at 04:55

## 2022-01-26 RX ADMIN — ZONISAMIDE 300 MG: 100 CAPSULE ORAL at 09:00

## 2022-01-26 NOTE — PROGRESS NOTES
1/26/2022   3:06 PM  Respiratory completed Oximetry, pt will not qualify for Home O2 at DC  CM discussed w/ pt, stating she feels better and has been off the O2 for several hours. Anxious to leave as ride is here. CM updated nursing  Care Management Interventions  PCP Verified by CM:  Yes Renato Martino MD, last visit 2 weeks )  Palliative Care Criteria Met (RRAT>21 & CHF Dx)?: No  Mode of Transport at Discharge: Self (spouse)  Physical Therapy Consult: No  Occupational Therapy Consult: No  Speech Therapy Consult: No  Support Systems: Spouse/Significant Other (pt lives w/ spouse in pvt residence, at baseline pt is ambulatory, iADLS, drives,)  Confirm Follow Up Transport: Self  Discharge Location  Patient Expects to be Discharged to[de-identified]  (home)  MARTITA Vergara

## 2022-01-26 NOTE — PROGRESS NOTES
1/26/2022  CARE MANAGEMENT NOTE:  CM reviewed EMR for clinical updates. READMISSION:  Pt was admitted to [facility] from 12/26/21 - 12/31/21 with COVID. She discharged home with oxygen thru [facility]. Reportedly, pt returned the home oxygen after a few days. Pt was readmitted to [facility] on 1/23/22 with acute respiratory failure with hypoxia. Reportedly, pt resides with her  Corie Huizar (B [phone]).    RUR 11%     Transition Plan of Care:  1. Pulmonary is following pt for medical management  2. Plan is for pt to return home with her   3. Home oxygen eval prior to discharge (pt had [facility] for home oxygen following last hospital discharge on 12/31/21)  4. Outpt f/u  5.  will transport pt home     CM will continue to follow pt until discharged.   Ayden

## 2022-01-26 NOTE — PROGRESS NOTES
Verbal shift change report given to Navneet Rajput RN (oncoming nurse) by Saritha Akbar RN (offgoing nurse). Report included the following information SBAR, Kardex, Procedure Summary, Intake/Output, MAR, Accordion, Recent Results and Med Rec Status.

## 2022-01-26 NOTE — DISCHARGE INSTRUCTIONS
HOSPITALIST DISCHARGE INSTRUCTIONS  NAME: Misti Smith   :  1960   MRN:  [id number]     Date/Time:  2022 12:02 PM    ADMIT DATE: 2022     DISCHARGE DATE: 2022     PRINCIPAL DISCHARGE DIAGNOSES:  Recent COVID-19 with lingering lung inflammation      MEDICATIONS:  · It is important that you take the medication exactly as they are prescribed. Note the changes and additions to your medications. Be sure you understand these changes before you are discharged today. · Keep your medication in the bottles provided by the pharmacist and keep a list of the medication names, dosages, and times to be taken in your wallet. · Do not take other medications without consulting your doctor. Pain Management: per above medications    What to do at Home    Recommended diet:  Resume previous diet    Recommended activity: Activity as tolerated    If you experience any of the following symptoms then please call your primary care physician or return to the emergency room if you cannot get hold of your doctor:  Fever, chills, severe chest pain, shortness of breath, dizziness, weakness, confusion, low oxygen levels, or other severe concerning symptoms    Follow Up: Follow-up Information     Follow up With Specialties Details Why Contact Info    Nalini Coronado MD Family Medicine Schedule an appointment as soon as possible for a visit Call for a follow up appointment. [address] [facility] [address] [address] [phone]      Demetrius Ornelas MD Pulmonary Disease In 1 week  [address] [place], [place] [phone]              Information obtained by :  I understand that if any problems occur once I am at home I am to contact my physician. I understand and acknowledge receipt of the instructions indicated above.                                                                                                                                            Physician's or R.N.'s Signature Date/Time                                                                                                                                              Patient or Representative Signature                                                          Date/Time

## 2022-01-26 NOTE — PROGRESS NOTES
01/26/22 1514   Resting (Room Air)   SpO2 97      During Walk (Room Air)   SpO2 91      Walk/Assistance Device Ambulation   After Walk   SpO2 98      Does the Patient Qualify for Home O2 No   Does the Patient Need Portable Oxygen Tanks No

## 2022-01-26 NOTE — PROGRESS NOTES
Attempted to schedule hospital follow up with PCP Dr Johana Anderson. Just get a voice recording stating that they are either closed or in a meeting.

## 2022-01-26 NOTE — PROGRESS NOTES
Bedside and Verbal shift change report given to [facility] (oncoming nurse) by Emani Moreland (offgoing nurse). Report included the following information SBAR, MAR and Quality Measures.

## 2022-01-26 NOTE — DISCHARGE SUMMARY
Physician Discharge Summary     Patient ID:  Juana Vázquez  [id number]  71 y.o.  1960    Admit date: 1/23/2022    Discharge date: 1/26/2022    Admission Diagnoses: Hypoxia [R09.02]    Principal Discharge Diagnoses:    hypoxia    OTHER PROBLEMS ADDRESSEDS  Principal Diagnosis <principal problem not specified>                                            Active Problems:    Seizure disorder ([facility] [address] (4/18/2013)      Hypothyroidism (4/18/2013)      Hypoxia (1/23/2022)       Patient Active Problem List   Diagnosis Code    Seizure disorder ([facility] [address] G40.909    Hypothyroidism E03.9    Acute hypoxemic respiratory failure due to COVID-19 ([facility] [address] U07.1, J96.01    Hypoxia R09.02         Hospital Course:   Ms. Kae Bose is a 63 yo F w/ hx of recent COVID-19 pneumonia, seizure d/o, hypothyroidism, depression presenting w/ dyspnea, admitted for AHRF    Acute respiratory failure with Hypoxia / Dyspnea:  Likely lingering effects of COVID. No evidence of PE on CTA. Probnp ok; echo w/ per normal wall motion. No valve concerns. Appreciate pulmonology following. Feeling much better and stable for discharge. Will assess for need for home O2 prior to DC. Continue steroid taper at discharge     Heart palpitations: TSH WNR. Monitored on telemetry     Seizure disorder: continue home Zonegran      Hypothyroidism: TSH WNR. Continue LT4     Urinary retention: continue home Detrol     Depression: continue home amitriptyline     Pt discharged in improved and stable condition    Procedures performed: see above    Imaging studies: see above  CTA CHEST W OR W WO CONT    Result Date: 1/23/2022  No evidence for acute pulmonary embolism. Moderate patchy bilateral ground glass and reticular lung opacities compatible with infectious process. XR CHEST PORT    Result Date: 1/23/2022  Multifocal bilateral airspace disease, similar to the prior study.      PCP: Nestor Hamman, MD    Consults: Pulmonary/Intensive care    Discharge Exam:  Patient Vitals for the past 12 hrs:   Temp Pulse Resp BP SpO2   01/26/22 1152 -- -- -- -- 98 %   01/26/22 1150 -- 85 17 115/62 97 %   01/26/22 0901 97.7 °F (36.5 °C) 80 18 105/65 95 %   01/26/22 0800 -- 89 -- -- --   01/26/22 0700 -- 83 -- -- --     GEN: NAD  CV: RRR  RESP: good air movement. No wheezing, rales, or rhonchi    Disposition: home    Patient Instructions:   Current Discharge Medication List      START taking these medications    Details   predniSONE (DELTASONE) 20 mg tablet Prednisone 20mg tabs:  3 tabs (60 mg) daily x 2 days, then  2 tabs (40 mg) daily x 3 days, then  1 tabs (20 mg) daily x 3 days  #15 TABS  Qty: 15 Tablet, Refills: 0  Start date: 1/26/2022         CONTINUE these medications which have NOT CHANGED    Details   dextroamphetamine-amphetamine (AdderalL) 10 mg tablet Take 10 mg by mouth two (2) times daily as needed (attention). valACYclovir (Valtrex) 500 mg tablet Take 500 mg by mouth daily. tolterodine (DETROL) 2 mg tablet Take 2 mg by mouth two (2) times a day. amitriptyline (ELAVIL) 10 mg tablet Take 30 mg by mouth nightly. levothyroxine (LevoxyL) 112 mcg tablet Take 112 mcg by mouth Daily (before breakfast). zonisamide (ZONEGRAN) 100 mg capsule Take 300 mg by mouth daily. STOP taking these medications       levoFLOXacin (Levaquin) 500 mg tablet Comments:   Reason for Stopping:         ipratropium-albuteroL (COMBIVENT RESPIMAT)  mcg/actuation inhaler Comments:   Reason for Stopping:               Activity: See discharge instructions  Diet: See discharge instructions  Wound Care: See discharge instructions    Follow-up Information     Follow up With Specialties Details Why Contact Info    Carlton Tafoya MD Family Medicine Schedule an appointment as soon as possible for a visit Call for a follow up appointment.  [address] [facility] [address] [address] [phone]      Nova Cantu MD Pulmonary Disease In 1 week  [address] [facility] [address] [facility]  [phone]            I spent 35 minutes on this discharge. Signed:  Lj Estrada MD  1/26/2022  12:04 PM    CC: PCP Dr. Dreama Res. Sherryle Moder

## 2022-01-26 NOTE — PROGRESS NOTES
[facility]     Name: Juana Vázquez MRN: [id number]   : 1960 Hospital: [address] [place] [address] Dr   Date: 2022        Impression Plan   1. Acute respiratory failure  2. hypoxia  3. Shortness of breath  4. cough  5. Hx of COVID 19 PNA                 · RT walked; did not require oxygen  · Prednisone taper  · Wean O2 as tolerated, keeping sats above 90%  · OOB into chair  · IS  · Follow-up in pulmonary clinic within 1-2 weeks    Ms. Kae Bose is stable from a pulmonary standpoint. We will sign off and arrange for outpatient follow-up in 2-3 weeks. Please call with questions. Radiology  ( personally reviewed) CTA chest : negative for PE and infiltrates consistent with COVID 19. Procalcitonin was low but CRP markedly elevated compared to discharge. ABG No results for input(s): PHI, PO2I, PCO2I in the last 72 hours. Subjective     Cc: increasing shortness of breath    61 yo with PMHx of hypothyroidism and recent hospitalization for COVID 19 PNA -22 presenting for increasing shortness of breath. Patient was discharged on 6L O2 after threatning to leave AMA. She was not discharged AMA so that Oxygen could be provided for her. Pt states that in the next few days she watched her sats closely and was consistently in the 90s. Her last dose of steroids was on 2022. Around 2022 she started feeling more short of breath again and sats would not sustain in the 90s. She developed a dry cough. She denies fevers/chills. CXR on this admission looks unchanged from . CTA negative for PE and infiltrates consistent with COVID 19. Procalcitonin was low but CRP markedly elevated compared to discharge. Review of Systems:  A comprehensive review of systems was negative except for that written in the HPI.      Interval History:    Afebrile  BP stable  Sats 98% on RA  WBC 18.6--increased  CRP 4.20-decreased  LFTs stable   blood cultures no growth x 3 days   ECHO: EF 60-65%, valves normal    ROS:  Frustrated because she hasn't been walked yet. Ready to go home. Feels \"amazing\". Past Medical History:   Diagnosis Date    Autoimmune disease ([address]     hypothroidism    Neurological disorder     seizures      Past Surgical History:   Procedure Laterality Date    HX ORTHOPAEDIC  1996    ruptureed disc      Prior to Admission medications    Medication Sig Start Date End Date Taking? Authorizing Provider   predniSONE (DELTASONE) 20 mg tablet Prednisone 20mg tabs:  3 tabs (60 mg) daily x 2 days, then  2 tabs (40 mg) daily x 3 days, then  1 tabs (20 mg) daily x 3 days  #15 TABS 1/26/22  Yes Doc Rosen MD   levoFLOXacin (Levaquin) 500 mg tablet Take 500 mg by mouth daily. 1/19/22 1/28/22 Yes Provider, Historical   dextroamphetamine-amphetamine (AdderalL) 10 mg tablet Take 10 mg by mouth two (2) times daily as needed (attention). Yes Provider, Historical   valACYclovir (Valtrex) 500 mg tablet Take 500 mg by mouth daily. Yes Provider, Historical   tolterodine (DETROL) 2 mg tablet Take 2 mg by mouth two (2) times a day. Yes Provider, Historical   amitriptyline (ELAVIL) 10 mg tablet Take 30 mg by mouth nightly. Yes Provider, Historical   levothyroxine (LevoxyL) 112 mcg tablet Take 112 mcg by mouth Daily (before breakfast). Yes Provider, Historical   zonisamide (ZONEGRAN) 100 mg capsule Take 300 mg by mouth daily.    Yes Provider, Historical     Current Facility-Administered Medications   Medication Dose Route Frequency    predniSONE (DELTASONE) tablet 40 mg  40 mg Oral BID WITH MEALS    sodium chloride (NS) flush 5-40 mL  5-40 mL IntraVENous Q8H    enoxaparin (LOVENOX) injection 40 mg  40 mg SubCUTAneous DAILY    amitriptyline (ELAVIL) tablet 30 mg  30 mg Oral QHS    levothyroxine (SYNTHROID) tablet 112 mcg  112 mcg Oral ACB    zonisamide (ZONEGRAN) capsule 300 mg  300 mg Oral DAILY    valACYclovir (VALTREX) tablet 500 mg  500 mg Oral DAILY    trospium (SANCTURA) tablet 20 mg  20 mg Oral BID    azithromycin (ZITHROMAX) 500 mg in 0.9% sodium chloride 250 mL (VIAL-MATE)  500 mg IntraVENous Q24H    guaiFENesin ER (MUCINEX) tablet 1,200 mg  1,200 mg Oral BID     No Known Allergies   Social History     Tobacco Use    Smoking status: Former Smoker    Smokeless tobacco: Current User   Substance Use Topics    Alcohol use: Yes     Comment: rarely      No family history on file. Laboratory: I have personally reviewed the critical care flowsheet and labs. Recent Labs     01/26/22  0501 01/24/22  0449   WBC 18.6* 9.0   HGB 11.5 11.6   HCT 34.0* 34.9*   * 528*     Recent Labs     01/26/22  0501 01/24/22  0449    133*   K 4.2 4.0    101   CO2 24 26   * 122*   BUN 16 10   CREA 0.70 0.65   CA 9.1 9.0   MG 2.5* 2.3   ALB 2.2* 2.1*   ALT 20 15       Objective:     Mode Rate Tidal Volume Pressure FiO2 PEEP            96 %       Vital Signs:     TMAX(24)      Intake/Output:   Last shift:         Last 3 shifts: No intake/output data recorded. RRIOLAST3  No intake or output data in the 24 hours ending 01/26/22 1357  EXAM:   GENERAL: awake, alert, HEENT:  PERRL, EOMI, no alar flaring or epistaxis, oral mucosa moist without cyanosis, NECK:  no jugular vein distention, no retractions, no thyromegaly or masses, LUNGS: Few crackles bilaterally HEART:  Regular rate and rhythm with no MGR; no edema is present, ABDOMEN:  soft with no tenderness, bowel sounds present, EXTREMITIES:  warm with no cyanosis, SKIN:  no jaundice or ecchymosis and NEUROLOGIC:  alert and oriented, grossly non-focal    Emry Borja, NP  [facility]

## 2022-01-27 ENCOUNTER — PATIENT OUTREACH (OUTPATIENT)
Dept: CASE MANAGEMENT | Age: 62
End: 2022-01-27

## 2022-01-27 LAB
FLUID CULTURE, SPNG2: NORMAL
L PNEUMO1 AG UR QL IA: NEGATIVE
ORGANISM ID, SPNG3: NORMAL
PLEASE NOTE, SPNG4: NORMAL
S PNEUM AG SPEC QL LA: NEGATIVE
SPECIMEN SOURCE: NORMAL
SPECIMEN SOURCE: NORMAL
SPECIMEN, SPNG1: NORMAL

## 2022-01-27 NOTE — PROGRESS NOTES
Transitions of Care Call  Call within 2 business days of discharge: Yes     Patient: Samreen Grdiego Patient : 1960 MRN: [id number]    [facility]       Complaint Diagnosis Description Type Department Provider    22 Cough; Palpitations; Shortness of Breath Acute hypoxemic respiratory failure due to COVID-19 [facility]) ED to Hosp-Admission (Discharged) (ADMIT) [id number] Kerry Cleveland MD; Kay Viramontes... Was this an external facility discharge? No    Challenges to be reviewed by the provider   Additional needs identified to be addressed with provider:  Yes    Readmission for increasing SOB and hypoxia. Pulmonary consulted. CT chest done- neg for PE and infiltrates c/w COVID 19.  resp panel- negative. Previous rapid test done 21- positive. No repeat test done. Discharged with prednisone taper over 8 days. Recommended PFT and follow up in 2 weeks. No O2 requirements at discharge. Maintain sat >90%. Encounter was not routed to provider for escalation. Method of communication with provider: none. Discussed COVID-19 related testing which was not done at this time. Current Symptoms:reports no symptoms at this time- feels better now since 2021    Reviewed New or Changed Meds: yes    Do you have what you need at home?  Durable Medical Equipment ordered at discharge: None- she had returned Oxygen to [place]. Does not have in home.  Home Health/Outpatient orders at discharge: none    Pulse oximeter? She has Pulse Oximeter at home. Discussed current values- 98% on room air. Reviewed goal >90% and when to notify provider or seek emergency care.        Patient education provided: Reviewed appropriate site of care based on symptoms and resources available to patient including: PCP and Specialist.     Follow up appointment scheduled within 7 days of discharge: no. If no appointment scheduled, scheduling offered: she states she will call to schedule with PCP and pulmonary. Interventions: Education of patient/family/caregiver/guardian to support self-management-  and Assessment and support for treatment adherence and medication management-   CTN reviewed discharge instructions, medical action plan and red flags with patient who verbalized understanding. Stressed importance of resting, remaining active and spacing out \"to do list\". Provided contact information for future needs. Plan for follow-up call in 5-7 days based on severity of symptoms and risk factors.   Plan for next call:   ·  Assess current symptoms including O2 sat levels  · Attended follow up with PCP  · Follow up in place with pulmonary 2 weeks recommended    Sudhir Rowe RN

## 2022-01-29 LAB
BACTERIA SPEC CULT: NORMAL
BACTERIA SPEC CULT: NORMAL
SERVICE CMNT-IMP: NORMAL
SERVICE CMNT-IMP: NORMAL